# Patient Record
Sex: FEMALE | Race: WHITE | NOT HISPANIC OR LATINO | Employment: FULL TIME | URBAN - METROPOLITAN AREA
[De-identification: names, ages, dates, MRNs, and addresses within clinical notes are randomized per-mention and may not be internally consistent; named-entity substitution may affect disease eponyms.]

---

## 2021-11-08 ENCOUNTER — TELEPHONE (OUTPATIENT)
Dept: FAMILY MEDICINE | Facility: CLINIC | Age: 31
End: 2021-11-08
Payer: COMMERCIAL

## 2021-11-08 NOTE — TELEPHONE ENCOUNTER
Recvd call from pt/Mariza, relayed mess to her per Sharon Dorantes CNP. Unable to assist with rescheduling, Mariza was coming in for a pregnancy confirmation and Sharon Dorantes CNP had no availability for the next 2 weeks. Mariza would like to just cancel, she will contact another clinic to see if she can get in elsewhere, if not she will call us bk.

## 2021-11-08 NOTE — TELEPHONE ENCOUNTER
lmtcb- pt has an apt today with Sharon Dorantes but she will not be in today. Please assist pt in rescheduling

## 2021-12-06 ENCOUNTER — TRANSFERRED RECORDS (OUTPATIENT)
Dept: PEDIATRICS | Facility: HOSPITAL | Age: 31
End: 2021-12-06

## 2021-12-06 LAB
ABO (EXTERNAL): NORMAL
ABO (EXTERNAL): NORMAL
HEMOGLOBIN (EXTERNAL): 13.8 G/DL (ref 11.7–15.5)
HEMOGLOBIN (EXTERNAL): 13.8 G/DL (ref 8–17)
HEPATITIS B SURFACE ANTIGEN (EXTERNAL): NONREACTIVE
HEPATITIS B SURFACE ANTIGEN (EXTERNAL): NONREACTIVE
HEPATITIS C ANTIBODY (EXTERNAL): NONREACTIVE
HIV1+2 AB SERPL QL IA: NONREACTIVE
PLATELET COUNT (EXTERNAL): 425 10E3/UL (ref 140–400)
PLATELET COUNT (EXTERNAL): 425 10E3/UL (ref 75–450)
RH (EXTERNAL): POSITIVE
RH (EXTERNAL): POSITIVE
RUBELLA ANTIBODY IGG (EXTERNAL): NORMAL
RUBELLA ANTIBODY IGG (EXTERNAL): NORMAL
TREPONEMA PALLIDUM ANTIBODY (EXTERNAL): NONREACTIVE
TREPONEMA PALLIDUM ANTIBODY (EXTERNAL): NONREACTIVE

## 2022-05-02 ENCOUNTER — TRANSFERRED RECORDS (OUTPATIENT)
Dept: HEALTH INFORMATION MANAGEMENT | Facility: CLINIC | Age: 32
End: 2022-05-02

## 2022-06-22 ENCOUNTER — TRANSFERRED RECORDS (OUTPATIENT)
Dept: HEALTH INFORMATION MANAGEMENT | Facility: CLINIC | Age: 32
End: 2022-06-22

## 2022-06-22 LAB — GROUP B STREPTOCOCCUS (EXTERNAL): NEGATIVE

## 2022-06-23 LAB — GROUP B STREPTOCOCCUS (EXTERNAL): NEGATIVE

## 2022-07-13 ENCOUNTER — ANESTHESIA (OUTPATIENT)
Dept: OBGYN | Facility: HOSPITAL | Age: 32
End: 2022-07-13
Payer: COMMERCIAL

## 2022-07-13 ENCOUNTER — ANESTHESIA EVENT (OUTPATIENT)
Dept: OBGYN | Facility: HOSPITAL | Age: 32
End: 2022-07-13
Payer: COMMERCIAL

## 2022-07-13 ENCOUNTER — HOSPITAL ENCOUNTER (INPATIENT)
Facility: HOSPITAL | Age: 32
LOS: 3 days | Discharge: HOME | End: 2022-07-16
Attending: OBSTETRICS & GYNECOLOGY | Admitting: OBSTETRICS & GYNECOLOGY
Payer: COMMERCIAL

## 2022-07-13 DIAGNOSIS — O92.79 INSUFFICIENT LACTATION: Primary | ICD-10-CM

## 2022-07-13 PROBLEM — Z34.90 PREGNANCY: Status: ACTIVE | Noted: 2022-07-13

## 2022-07-13 PROBLEM — Z36.89 ENCOUNTER FOR TRIAGE IN PREGNANT PATIENT: Status: ACTIVE | Noted: 2022-07-13

## 2022-07-13 LAB
ABO/RH(D): NORMAL
ANTIBODY SCREEN: NEGATIVE
APTT PPP: 29 SECONDS (ref 22–38)
ERYTHROCYTE [DISTWIDTH] IN BLOOD BY AUTOMATED COUNT: 13.2 % (ref 10–15)
HCT VFR BLD AUTO: 42.6 % (ref 35–47)
HGB BLD-MCNC: 14.5 G/DL (ref 11.7–15.7)
INR PPP: 0.92 (ref 0.85–1.15)
MCH RBC QN AUTO: 31 PG (ref 26.5–33)
MCHC RBC AUTO-ENTMCNC: 34 G/DL (ref 31.5–36.5)
MCV RBC AUTO: 91 FL (ref 78–100)
PLATELET # BLD AUTO: 302 10E3/UL (ref 150–450)
RBC # BLD AUTO: 4.67 10E6/UL (ref 3.8–5.2)
RUPTURE OF FETAL MEMBRANES BY ROM PLUS: POSITIVE
SPECIMEN EXPIRATION DATE: NORMAL
T PALLIDUM AB SER QL: NONREACTIVE
WBC # BLD AUTO: 18.9 10E3/UL (ref 4–11)

## 2022-07-13 PROCEDURE — 250N000013 HC RX MED GY IP 250 OP 250 PS 637: Performed by: OBSTETRICS & GYNECOLOGY

## 2022-07-13 PROCEDURE — 86780 TREPONEMA PALLIDUM: CPT | Performed by: OBSTETRICS & GYNECOLOGY

## 2022-07-13 PROCEDURE — 250N000009 HC RX 250: Performed by: ANESTHESIOLOGY

## 2022-07-13 PROCEDURE — 36415 COLL VENOUS BLD VENIPUNCTURE: CPT | Performed by: OBSTETRICS & GYNECOLOGY

## 2022-07-13 PROCEDURE — 85730 THROMBOPLASTIN TIME PARTIAL: CPT | Performed by: OBSTETRICS & GYNECOLOGY

## 2022-07-13 PROCEDURE — 84112 EVAL AMNIOTIC FLUID PROTEIN: CPT | Performed by: OBSTETRICS & GYNECOLOGY

## 2022-07-13 PROCEDURE — 120N000001 HC R&B MED SURG/OB

## 2022-07-13 PROCEDURE — 370N000003 HC ANESTHESIA WARD SERVICE

## 2022-07-13 PROCEDURE — 86901 BLOOD TYPING SEROLOGIC RH(D): CPT | Performed by: OBSTETRICS & GYNECOLOGY

## 2022-07-13 PROCEDURE — 250N000011 HC RX IP 250 OP 636: Performed by: OBSTETRICS & GYNECOLOGY

## 2022-07-13 PROCEDURE — 250N000009 HC RX 250: Performed by: OBSTETRICS & GYNECOLOGY

## 2022-07-13 PROCEDURE — 85610 PROTHROMBIN TIME: CPT | Performed by: OBSTETRICS & GYNECOLOGY

## 2022-07-13 PROCEDURE — 85027 COMPLETE CBC AUTOMATED: CPT | Performed by: OBSTETRICS & GYNECOLOGY

## 2022-07-13 PROCEDURE — 250N000011 HC RX IP 250 OP 636: Performed by: ANESTHESIOLOGY

## 2022-07-13 PROCEDURE — 258N000003 HC RX IP 258 OP 636: Performed by: OBSTETRICS & GYNECOLOGY

## 2022-07-13 RX ORDER — OXYTOCIN 10 [USP'U]/ML
10 INJECTION, SOLUTION INTRAMUSCULAR; INTRAVENOUS
Status: DISCONTINUED | OUTPATIENT
Start: 2022-07-13 | End: 2022-07-16 | Stop reason: HOSPADM

## 2022-07-13 RX ORDER — SODIUM CHLORIDE, SODIUM LACTATE, POTASSIUM CHLORIDE, CALCIUM CHLORIDE 600; 310; 30; 20 MG/100ML; MG/100ML; MG/100ML; MG/100ML
INJECTION, SOLUTION INTRAVENOUS CONTINUOUS
Status: DISCONTINUED | OUTPATIENT
Start: 2022-07-13 | End: 2022-07-16 | Stop reason: HOSPADM

## 2022-07-13 RX ORDER — NALOXONE HYDROCHLORIDE 0.4 MG/ML
0.4 INJECTION, SOLUTION INTRAMUSCULAR; INTRAVENOUS; SUBCUTANEOUS
Status: DISCONTINUED | OUTPATIENT
Start: 2022-07-13 | End: 2022-07-14 | Stop reason: HOSPADM

## 2022-07-13 RX ORDER — CITRIC ACID/SODIUM CITRATE 334-500MG
30 SOLUTION, ORAL ORAL
Status: DISCONTINUED | OUTPATIENT
Start: 2022-07-13 | End: 2022-07-14 | Stop reason: HOSPADM

## 2022-07-13 RX ORDER — KETOROLAC TROMETHAMINE 30 MG/ML
30 INJECTION, SOLUTION INTRAMUSCULAR; INTRAVENOUS
Status: DISCONTINUED | OUTPATIENT
Start: 2022-07-13 | End: 2022-07-16 | Stop reason: HOSPADM

## 2022-07-13 RX ORDER — LIDOCAINE 40 MG/G
CREAM TOPICAL
Status: DISCONTINUED | OUTPATIENT
Start: 2022-07-13 | End: 2022-07-14 | Stop reason: HOSPADM

## 2022-07-13 RX ORDER — LIDOCAINE HYDROCHLORIDE AND EPINEPHRINE 15; 5 MG/ML; UG/ML
3 INJECTION, SOLUTION EPIDURAL
Status: DISCONTINUED | OUTPATIENT
Start: 2022-07-13 | End: 2022-07-14 | Stop reason: HOSPADM

## 2022-07-13 RX ORDER — OXYTOCIN/0.9 % SODIUM CHLORIDE 30/500 ML
1-5 PLASTIC BAG, INJECTION (ML) INTRAVENOUS CONTINUOUS
Status: DISCONTINUED | OUTPATIENT
Start: 2022-07-13 | End: 2022-07-14 | Stop reason: HOSPADM

## 2022-07-13 RX ORDER — MISOPROSTOL 200 UG/1
TABLET ORAL
Status: DISCONTINUED
Start: 2022-07-13 | End: 2022-07-14 | Stop reason: WASHOUT

## 2022-07-13 RX ORDER — OXYTOCIN/0.9 % SODIUM CHLORIDE 30/500 ML
340 PLASTIC BAG, INJECTION (ML) INTRAVENOUS CONTINUOUS PRN
Status: DISCONTINUED | OUTPATIENT
Start: 2022-07-13 | End: 2022-07-14 | Stop reason: HOSPADM

## 2022-07-13 RX ORDER — ONDANSETRON 2 MG/ML
4 INJECTION INTRAMUSCULAR; INTRAVENOUS EVERY 6 HOURS PRN
Status: DISCONTINUED | OUTPATIENT
Start: 2022-07-13 | End: 2022-07-14 | Stop reason: HOSPADM

## 2022-07-13 RX ORDER — LIDOCAINE HYDROCHLORIDE 10 MG/ML
INJECTION, SOLUTION EPIDURAL; INFILTRATION; INTRACAUDAL; PERINEURAL
Status: DISPENSED
Start: 2022-07-13 | End: 2022-07-14

## 2022-07-13 RX ORDER — NALOXONE HYDROCHLORIDE 0.4 MG/ML
0.2 INJECTION, SOLUTION INTRAMUSCULAR; INTRAVENOUS; SUBCUTANEOUS
Status: DISCONTINUED | OUTPATIENT
Start: 2022-07-13 | End: 2022-07-14 | Stop reason: HOSPADM

## 2022-07-13 RX ORDER — OXYTOCIN/0.9 % SODIUM CHLORIDE 30/500 ML
100-340 PLASTIC BAG, INJECTION (ML) INTRAVENOUS CONTINUOUS PRN
Status: DISCONTINUED | OUTPATIENT
Start: 2022-07-13 | End: 2022-07-16 | Stop reason: HOSPADM

## 2022-07-13 RX ORDER — CARBOPROST TROMETHAMINE 250 UG/ML
250 INJECTION, SOLUTION INTRAMUSCULAR
Status: DISCONTINUED | OUTPATIENT
Start: 2022-07-13 | End: 2022-07-14 | Stop reason: HOSPADM

## 2022-07-13 RX ORDER — ONDANSETRON 4 MG/1
4 TABLET, ORALLY DISINTEGRATING ORAL EVERY 6 HOURS PRN
Status: DISCONTINUED | OUTPATIENT
Start: 2022-07-13 | End: 2022-07-14 | Stop reason: HOSPADM

## 2022-07-13 RX ORDER — NALBUPHINE HYDROCHLORIDE 10 MG/ML
2.5-5 INJECTION, SOLUTION INTRAMUSCULAR; INTRAVENOUS; SUBCUTANEOUS EVERY 6 HOURS PRN
Status: DISCONTINUED | OUTPATIENT
Start: 2022-07-13 | End: 2022-07-16 | Stop reason: HOSPADM

## 2022-07-13 RX ORDER — FENTANYL CITRATE 50 UG/ML
50-100 INJECTION, SOLUTION INTRAMUSCULAR; INTRAVENOUS
Status: DISCONTINUED | OUTPATIENT
Start: 2022-07-13 | End: 2022-07-16 | Stop reason: HOSPADM

## 2022-07-13 RX ORDER — LIDOCAINE 40 MG/G
CREAM TOPICAL
Status: DISCONTINUED | OUTPATIENT
Start: 2022-07-13 | End: 2022-07-13 | Stop reason: HOSPADM

## 2022-07-13 RX ORDER — OXYTOCIN 10 [USP'U]/ML
10 INJECTION, SOLUTION INTRAMUSCULAR; INTRAVENOUS
Status: DISCONTINUED | OUTPATIENT
Start: 2022-07-13 | End: 2022-07-14 | Stop reason: HOSPADM

## 2022-07-13 RX ORDER — CALCIUM CARBONATE 500 MG/1
500 TABLET, CHEWABLE ORAL 3 TIMES DAILY PRN
Status: DISCONTINUED | OUTPATIENT
Start: 2022-07-13 | End: 2022-07-16 | Stop reason: HOSPADM

## 2022-07-13 RX ORDER — IBUPROFEN 800 MG/1
800 TABLET, FILM COATED ORAL
Status: DISCONTINUED | OUTPATIENT
Start: 2022-07-13 | End: 2022-07-16 | Stop reason: HOSPADM

## 2022-07-13 RX ORDER — PRENATAL VIT/IRON FUM/FOLIC AC 27MG-0.8MG
1 TABLET ORAL DAILY
COMMUNITY

## 2022-07-13 RX ORDER — EPHEDRINE SULFATE 50 MG/ML
5 INJECTION, SOLUTION INTRAMUSCULAR; INTRAVENOUS; SUBCUTANEOUS
Status: DISCONTINUED | OUTPATIENT
Start: 2022-07-13 | End: 2022-07-14 | Stop reason: HOSPADM

## 2022-07-13 RX ORDER — SODIUM CHLORIDE, SODIUM LACTATE, POTASSIUM CHLORIDE, CALCIUM CHLORIDE 600; 310; 30; 20 MG/100ML; MG/100ML; MG/100ML; MG/100ML
INJECTION, SOLUTION INTRAVENOUS CONTINUOUS
Status: DISCONTINUED | OUTPATIENT
Start: 2022-07-13 | End: 2022-07-14 | Stop reason: HOSPADM

## 2022-07-13 RX ADMIN — FENTANYL CITRATE 100 MCG: 50 INJECTION, SOLUTION INTRAMUSCULAR; INTRAVENOUS at 12:23

## 2022-07-13 RX ADMIN — Medication 1 MILLI-UNITS/MIN: at 07:36

## 2022-07-13 RX ADMIN — CALCIUM CARBONATE (ANTACID) CHEW TAB 500 MG 500 MG: 500 CHEW TAB at 19:29

## 2022-07-13 RX ADMIN — FENTANYL CITRATE 100 MCG: 50 INJECTION, SOLUTION INTRAMUSCULAR; INTRAVENOUS at 09:56

## 2022-07-13 RX ADMIN — SODIUM CHLORIDE, POTASSIUM CHLORIDE, SODIUM LACTATE AND CALCIUM CHLORIDE: 600; 310; 30; 20 INJECTION, SOLUTION INTRAVENOUS at 06:50

## 2022-07-13 RX ADMIN — SODIUM CHLORIDE, POTASSIUM CHLORIDE, SODIUM LACTATE AND CALCIUM CHLORIDE: 600; 310; 30; 20 INJECTION, SOLUTION INTRAVENOUS at 07:30

## 2022-07-13 RX ADMIN — Medication 10 ML/HR: at 21:02

## 2022-07-13 RX ADMIN — FENTANYL CITRATE 100 MCG: 50 INJECTION, SOLUTION INTRAMUSCULAR; INTRAVENOUS at 11:07

## 2022-07-13 RX ADMIN — Medication: at 13:15

## 2022-07-13 RX ADMIN — Medication 5 MG: at 13:36

## 2022-07-13 ASSESSMENT — ACTIVITIES OF DAILY LIVING (ADL)
DOING_ERRANDS_INDEPENDENTLY_DIFFICULTY: NO
ADLS_ACUITY_SCORE: 20
CONCENTRATING,_REMEMBERING_OR_MAKING_DECISIONS_DIFFICULTY: NO
ADLS_ACUITY_SCORE: 20
ADLS_ACUITY_SCORE: 20
DRESSING/BATHING_DIFFICULTY: NO
DIFFICULTY_EATING/SWALLOWING: NO
ADLS_ACUITY_SCORE: 20
FALL_HISTORY_WITHIN_LAST_SIX_MONTHS: NO
WALKING_OR_CLIMBING_STAIRS_DIFFICULTY: NO
WEAR_GLASSES_OR_BLIND: YES
ADLS_ACUITY_SCORE: 20
TOILETING_ISSUES: NO
VISION_MANAGEMENT: GLASSES
CHANGE_IN_FUNCTIONAL_STATUS_SINCE_ONSET_OF_CURRENT_ILLNESS/INJURY: NO
ADLS_ACUITY_SCORE: 20

## 2022-07-13 NOTE — H&P
22 Mariza Sethi  88    Chief complaint: Leaking fluid and labor.    HPI: Patient is a 32-year-old  1 para 0 white female at 40 weeks 0 days who noted the spontaneous onset of labor in the early morning hours of 2022.  She is now milagros every 4 to 5 minutes.  She is also had a somewhat watery discharge.  She is a smoker.  She also had COVID during this pregnancy.  Because of those 2 issues, she underwent fetal surveillance from 34 weeks on with reassuring findings except for a biophysical profile score recently of 6/8 with decreased movement, borderline low amniotic fluid volume and elevated umbilical artery systolic/diastolic ratio.    PMH: At her first visit, she denied medical problems except some anxiety.  She takes prenatal vitamins.  She has no known drug allergies.  She was hospitalized in  for dehydration and anxiety.    PFSH: She has been  for 5 years and smokes about 1/4 pack of cigarettes a day.  She denies alcohol use during pregnancy.    ROS: Noncontributory.    PE: Blood pressure 113/69, pulse 88, temp 98.0.    General appearance: Well-developed, well-nourished woman with a large gravid uterus.  Recent ultrasound placed the estimated weight at 8 pounds 1 ounce.  Lungs: Clear.  Heart: Normal sinus rhythm without murmurs.  Abdomen: Gravid uterus present with last fundal height in the office at 39 cm.  Fetal heart rate tracing is category 1, reactive.  Contractions are every 4 to 5 minutes.  Cervix: 1 cm, 60% effaced, far posterior.  (Nurses exam)    Assessment: 1.  Intrauterine pregnancy at 40 weeks 0 days.  2.  Spontaneous rupture of membranes with early labor.  Contractions only every 5 minutes.  3.  Past history of COVID in pregnancy.    Plan: She will be admitted and observe her progress in labor.  I will start a low-dose of oxytocin to get the contractions closer as she is quite comfortable at this point.  She will want an epidural for pain relief, she states.   I will wait until that is in before I do another exam as exams are quite uncomfortable for her.  I expect spontaneous vaginal birth.    Man Marie MD

## 2022-07-13 NOTE — ANESTHESIA PREPROCEDURE EVALUATION
Anesthesia Pre-Procedure Evaluation    Patient: Mariza Sethi   MRN: [id number] : 1990        Procedure : * No procedures listed *          History reviewed. No pertinent past medical history.   History reviewed. No pertinent surgical history.   No Known Allergies   Social History     Tobacco Use     Smoking status: Current Every Day Smoker     Packs/day: 0.25     Types: Cigarettes     Smokeless tobacco: Never Used     Tobacco comment: was on NRT   Substance Use Topics     Alcohol use: Not Currently      Wt Readings from Last 1 Encounters:   22 62.1 kg (137 lb)        Anesthesia Evaluation   Pt has not had prior anesthetic         ROS/MED HX  ENT/Pulmonary:  - neg pulmonary ROS     Neurologic:  - neg neurologic ROS     Cardiovascular:  - neg cardiovascular ROS     METS/Exercise Tolerance:     Hematologic:  - neg hematologic  ROS     Musculoskeletal:       GI/Hepatic:  - neg GI/hepatic ROS     Renal/Genitourinary:  - neg Renal ROS     Endo:  - neg endo ROS     Psychiatric/Substance Use:  - neg psychiatric ROS     Infectious Disease:  - neg infectious disease ROS     Malignancy:  - neg malignancy ROS     Other:      (+) Possibly pregnant, ,         Physical Exam    Airway  airway exam normal           Respiratory Devices and Support         Dental  no notable dental history         Cardiovascular   cardiovascular exam normal          Pulmonary   pulmonary exam normal                OUTSIDE LABS:  CBC:   Lab Results   Component Value Date    WBC 18.9 (H) 2022    HGB 14.5 2022    HCT 42.6 2022     2022     BMP: No results found for: NA, POTASSIUM, CHLORIDE, CO2, BUN, CR, GLC  COAGS:   Lab Results   Component Value Date    PTT 29 2022    INR 0.92 2022     POC: No results found for: BGM, HCG, HCGS  HEPATIC: No results found for: ALBUMIN, PROTTOTAL, ALT, AST, GGT, ALKPHOS, BILITOTAL, BILIDIRECT, NORA  OTHER: No results found for: PH, LACT, A1C, SELWYN, PHOS, MAG,  LIPASE, AMYLASE, TSH, T4, T3, CRP, SED    Anesthesia Plan    ASA Status:  2      Anesthesia Type: Epidural.              Consents            Postoperative Care    Pain management: Oral pain medications.   PONV prophylaxis: Ondansetron (or other 5HT-3)     Comments:                Denilson Wynn MD

## 2022-07-13 NOTE — PROGRESS NOTES
Dr. Marie on unit. Reviewed tracing. ts CAT I and irreg cxns.  MD and RN in to see pt and family. MD discussed plan to start low dose Pitocin as cxns 5-9 min apart. Pt in agreement. MD stated that pt can get an epidural at any time. RN discussed the need for IVFs before MDA can be called. Information given that pt will be on bedrest after the epidural, will get a srivastava catheter and will be allowed clear liquids. Also discussed expectation of the epidural. Pt will let RN know when she is ready. LR started at 125ml/hr in preparation for Pitocin.Lisette Villalta RN

## 2022-07-13 NOTE — PLAN OF CARE
Problem: Plan of Care - These are the overarching goals to be used throughout the patient stay.    Goal: Plan of Care Review/Shift Note  Description: The Plan of Care Review/Shift note should be completed every shift.  The Outcome Evaluation is a brief statement about your assessment that the patient is improving, declining, or no change.  This information will be displayed automatically on your shift note.  7/13/2022 0426 by Lisette Villalta RN  Outcome: Ongoing, Progressing  Flowsheets (Taken 7/13/2022 0426)  Plan of Care Reviewed With:   patient   significant other  Outcome Evaluation: pt will have a successful progression of labor without infection  7/13/2022 0425 by Lisette Villalta RN  Outcome: Ongoing, Progressing    Problem: Delayed Labor Progression (Labor)  Goal: Effective Progression to Delivery  7/13/2022 0426 by Lisette Villalta RN  Outcome: Ongoing, Progressing    Problem: Infection (Labor)  Goal: Absence of Infection Signs and Symptoms  7/13/2022 0426 by Lisette Villalta RN  Outcome: Ongoing, Progressing    Problem: Change in Fetal Wellbeing (Labor)  Goal: Stable Fetal Wellbeing  7/13/2022 0426 by Lisette Villalta RN  Outcome: Ongoing, Progressing          Fhts CAT I. Irregular cxns present that are mild-mod in palpation. ROM+ positive. Will use 0200 as SROM time. Pt reported a pinkish-white discharge. No fluid noted by this RN with sve or since. Dr. Marie notified and will be in to see pt in about an hour..Lisette Villalta RN

## 2022-07-13 NOTE — ANESTHESIA PROCEDURE NOTES
Epidural catheter Procedure Note    Pre-Procedure   Staff -        Anesthesiologist:  Denilson Wynn MD       Performed By: anesthesiologist       Location: OB       Procedure Start/Stop Times: 7/13/2022 1:00 PM and 7/13/2022 1:20 PM       Pre-Anesthestic Checklist: patient identified, IV checked, risks and benefits discussed, informed consent, monitors and equipment checked, pre-op evaluation, at physician/surgeon's request and post-op pain management  Timeout:       Correct Patient: Yes        Correct Procedure: Yes        Correct Site: Yes        Correct Position: Yes   Procedure Documentation  Procedure: epidural catheter       Diagnosis: Labor       Patient Position: sitting       Patient Prep/Sterile Barriers: sterile gloves, mask, patient draped       Skin prep: Chloraprep       Local skin infiltrated with mL of 1% lidocaine.        Insertion Site: L1-2. (midline approach).       Technique: LORT air        Needle Type: Dariana       Needle Gauge: 18.        Needle Length (Inches): 3.5        Catheter: 20 G.          Catheter threaded easily.         6 cm epidural space.         Threaded 10 cm at skin.         # of attempts: 1 and  # of redirects:  0    Assessment/Narrative         Paresthesias: No.       Test dose of 3 mL lidocaine 1.5% w/ 1:200,000 epinephrine at.         Test dose negative, 3 minutes after injection, for signs of intravascular, subdural, or intrathecal injection.       Insertion/Infusion Method: LORT air       Aspiration negative for Heme or CSF via Epidural Catheter.    Medication(s) Administered   Medication Administration Time: 7/13/2022 1:00 PM

## 2022-07-13 NOTE — PROGRESS NOTES
Pt notified of conversation with Dr. Marie re: getting ROM+ and checking cervix. Pt in agreement.   ROM+ collected and sent.   sve done and was 1/60/-3, moderate to soft and posterior. Vertex palpated. No bloody show.   Dr. Marie notified via phone. States no change from yesterday. Informed RN that BPP recently was 6/8 for low fluid and umbilical artery systolic and diastolic reading was 3.8; which is elevated. MD would like to monitor for a couple hours and re-evaluate.   Pt and family (maddison Menendez and his Mother Amy) notified of conversation. Discussed labor definition, labor process, stages of labor. Enc pt to rest between cxns at this time. Will continue to monitor.Lisette Villalta RN

## 2022-07-13 NOTE — PROGRESS NOTES
Notified pt and family of SROM and Dr. Marie will come in about 0530. Pt asking appropriate questions.   Discussed that pt will want an epidural and explained that she will need an IV and IVFs prior to placement. Offered pt to place IV at this time, or she may wait until later if she chooses. Pt opted for IV to be placed at this time. #18 gauge IV to left lower forearm x1 attempt. New dressing applied and IV saline locked.Lisette Villalta RN

## 2022-07-13 NOTE — PROGRESS NOTES
30 y/o g1 at 40 weeks gest with edc 7-13-22 presents to [facility] with reports of cxns since 0030. Has had pinkish this discharge in last hour. Denies complications with pregnancy. GBS neg. Pt had COVID in mid May, falls within 90 days and therefore is COVID recovered. Rates cxns as 8/10 and feeling them low to abdomen.   Monitors applied and POC discussed. Pt here with SO Shon.   First cxn pt had after arrival was mild-moderate.   Pt was in clinic yesterday and cervix was closed.   VSS.  Afebrile.   Dr. Marie notified of pt arrival. MD gave orders to obtain ROM +, check cervix and call MD back.Lisette Villalta RN

## 2022-07-14 PROCEDURE — 722N000001 HC LABOR CARE VAGINAL DELIVERY SINGLE

## 2022-07-14 PROCEDURE — 250N000013 HC RX MED GY IP 250 OP 250 PS 637: Performed by: OBSTETRICS & GYNECOLOGY

## 2022-07-14 PROCEDURE — 250N000011 HC RX IP 250 OP 636: Performed by: OBSTETRICS & GYNECOLOGY

## 2022-07-14 PROCEDURE — 0DQP0ZZ REPAIR RECTUM, OPEN APPROACH: ICD-10-PCS | Performed by: OBSTETRICS & GYNECOLOGY

## 2022-07-14 PROCEDURE — 120N000001 HC R&B MED SURG/OB

## 2022-07-14 RX ORDER — OXYCODONE HYDROCHLORIDE 5 MG/1
5 TABLET ORAL EVERY 4 HOURS PRN
Status: DISCONTINUED | OUTPATIENT
Start: 2022-07-14 | End: 2022-07-16 | Stop reason: HOSPADM

## 2022-07-14 RX ORDER — NALOXONE HYDROCHLORIDE 0.4 MG/ML
0.2 INJECTION, SOLUTION INTRAMUSCULAR; INTRAVENOUS; SUBCUTANEOUS
Status: DISCONTINUED | OUTPATIENT
Start: 2022-07-14 | End: 2022-07-16 | Stop reason: HOSPADM

## 2022-07-14 RX ORDER — IBUPROFEN 800 MG/1
800 TABLET, FILM COATED ORAL EVERY 6 HOURS PRN
Status: DISCONTINUED | OUTPATIENT
Start: 2022-07-14 | End: 2022-07-16 | Stop reason: HOSPADM

## 2022-07-14 RX ORDER — BISACODYL 10 MG
10 SUPPOSITORY, RECTAL RECTAL DAILY PRN
Status: DISCONTINUED | OUTPATIENT
Start: 2022-07-14 | End: 2022-07-16 | Stop reason: HOSPADM

## 2022-07-14 RX ORDER — NALOXONE HYDROCHLORIDE 0.4 MG/ML
0.4 INJECTION, SOLUTION INTRAMUSCULAR; INTRAVENOUS; SUBCUTANEOUS
Status: DISCONTINUED | OUTPATIENT
Start: 2022-07-14 | End: 2022-07-16 | Stop reason: HOSPADM

## 2022-07-14 RX ORDER — ACETAMINOPHEN 325 MG/1
650 TABLET ORAL EVERY 4 HOURS PRN
Status: DISCONTINUED | OUTPATIENT
Start: 2022-07-14 | End: 2022-07-16 | Stop reason: HOSPADM

## 2022-07-14 RX ORDER — DOCUSATE SODIUM 100 MG/1
100 CAPSULE, LIQUID FILLED ORAL DAILY
Status: DISCONTINUED | OUTPATIENT
Start: 2022-07-14 | End: 2022-07-15

## 2022-07-14 RX ORDER — MODIFIED LANOLIN
OINTMENT (GRAM) TOPICAL
Status: DISCONTINUED | OUTPATIENT
Start: 2022-07-14 | End: 2022-07-16 | Stop reason: HOSPADM

## 2022-07-14 RX ORDER — HYDROCORTISONE 25 MG/G
CREAM TOPICAL 3 TIMES DAILY PRN
Status: DISCONTINUED | OUTPATIENT
Start: 2022-07-14 | End: 2022-07-16 | Stop reason: HOSPADM

## 2022-07-14 RX ADMIN — ACETAMINOPHEN 650 MG: 325 TABLET, FILM COATED ORAL at 07:51

## 2022-07-14 RX ADMIN — Medication: at 07:50

## 2022-07-14 RX ADMIN — ACETAMINOPHEN 650 MG: 325 TABLET, FILM COATED ORAL at 16:17

## 2022-07-14 RX ADMIN — IBUPROFEN 800 MG: 800 TABLET ORAL at 18:15

## 2022-07-14 RX ADMIN — IBUPROFEN 800 MG: 800 TABLET ORAL at 11:32

## 2022-07-14 RX ADMIN — KETOROLAC TROMETHAMINE 30 MG: 30 INJECTION, SOLUTION INTRAMUSCULAR; INTRAVENOUS at 05:23

## 2022-07-14 RX ADMIN — ACETAMINOPHEN 650 MG: 325 TABLET, FILM COATED ORAL at 21:11

## 2022-07-14 RX ADMIN — Medication: at 21:11

## 2022-07-14 RX ADMIN — CALCIUM CARBONATE (ANTACID) CHEW TAB 500 MG 500 MG: 500 CHEW TAB at 05:22

## 2022-07-14 RX ADMIN — OXYCODONE HYDROCHLORIDE 5 MG: 5 TABLET ORAL at 18:43

## 2022-07-14 RX ADMIN — DOCUSATE SODIUM 100 MG: 100 CAPSULE, LIQUID FILLED ORAL at 07:51

## 2022-07-14 RX ADMIN — ACETAMINOPHEN 650 MG: 325 TABLET, FILM COATED ORAL at 11:53

## 2022-07-14 RX ADMIN — CALCIUM CARBONATE (ANTACID) CHEW TAB 500 MG 500 MG: 500 CHEW TAB at 01:09

## 2022-07-14 RX ADMIN — OXYCODONE HYDROCHLORIDE 5 MG: 5 TABLET ORAL at 22:58

## 2022-07-14 RX ADMIN — WITCH HAZEL: 500 SOLUTION RECTAL; TOPICAL at 07:50

## 2022-07-14 ASSESSMENT — ACTIVITIES OF DAILY LIVING (ADL)
ADLS_ACUITY_SCORE: 20
ADLS_ACUITY_SCORE: 24
ADLS_ACUITY_SCORE: 20
ADLS_ACUITY_SCORE: 24
ADLS_ACUITY_SCORE: 24
ADLS_ACUITY_SCORE: 21
ADLS_ACUITY_SCORE: 20
ADLS_ACUITY_SCORE: 21
ADLS_ACUITY_SCORE: 24
ADLS_ACUITY_SCORE: 24

## 2022-07-14 NOTE — L&D DELIVERY NOTE
22 Mariza Sethi [facility] 88    Delivery note    Patient is a 32-year-old  1 now para 1 woman who presented in spontaneous labor in the early morning hours of 2022.  She was milagros every 4 to 5 minutes and had a discharge.  The discharge was tested and found to be positive for rupture of membranes.  She was undergoing fetal surveillance for COVID during the pregnancy and a smoking history.  Her last biophysical profile score was 6 out of 8 with decreased gross body movements and an elevated umbilical artery SD ratio of 3.68.  Amniotic fluid volume was low normal.  She was started on low-dose oxytocin because of the ruptured membranes and made slow progress through labor with an epidural for pain relief.  She was completely dilated at 2230  hours and pushed for 2 hours and 45 minutes.  She was fatigued and rested for 15 minutes and then pushed again for short time.  She was then allowed to labor down for an hour and started pushing again.  By that time the vertex was visible at the introitus with a push and she had been in the second stage for 5 1/2 hours.  I recommended a vacuum-assisted vaginal birth and discussed the risks and she was agreeable.  Kiwi vacuum was applied.  On the third pull it was apparent that there was no elasticity in the introitus, therefore I cut a midline episiotomy to facilitate the birth.  There were no pop offs.  The baby was vigorous with Apgar scores of 9 and 9.  The placenta followed spontaneously intact.  There was small 4th degree perineal laceration which lacerated the anal sphincter down into the right.  I repaired the laceration in the routine manner with 3-0 Vicryl suture and 0 PDS suture on the sphincter muscle and capsule.  Estimated blood loss was 700 to 800 cc.  I expect routine postpartum care except for healing of the small fourth degree laceration.    Man Marie MD

## 2022-07-14 NOTE — PLAN OF CARE
Pt is doing well postpartum.  Vaginal bleeding is light and fundus is firm.  Pt is breastfeeding well and is bonding with the baby.  She denies pain. VSS Afebrile

## 2022-07-14 NOTE — PLAN OF CARE
Problem: Urinary Retention (Postpartum Vaginal Delivery)  Goal: Effective Urinary Elimination  Outcome: Met      Problem: Pain (Postpartum Vaginal Delivery)  Goal: Acceptable Pain Control  Outcome: Ongoing, Progressing  Intervention: Prevent or Manage Pain  Recent Flowsheet Documentation  Taken 7/14/2022 1132 by Kateryna Ngo, RN  Pain Management Interventions:   medication (see MAR)   repositioned  Taken 7/14/2022 0800 by Kateryna Ngo, RN  Pain Management Interventions: medication (see MAR)   Patient had mild abdominal cramping this morning.  Patient reports perineum pain at 6/10.  Patient given tylenol, ibuprofen, tucks, dermoplast and ice pack to perineum per MD orders.  Patient left leg still with some numbness  Able to bed knee and move extremity up and down in bed but unable to lift left leg up off of bed.  Patient unable to bear weight on LLE without knee buckling.  EZ stand used to assist patient to bathroom x3 this shift.  Will continue to monitor.

## 2022-07-14 NOTE — LACTATION NOTE
This note was copied from a baby's chart.  Lactation consultant to patient room to assess breastfeeding. Mom states baby latched well after delivery, but has been sleepy since delivery. States she has been educated on hand expression, and gotten a few drops per side prior to this feeding attempt.    Reviewed benefit of skin to skin prior to feeding to help get baby ready for feeding, importance of feeding baby on early hunger cues, and breastfeeding 8-12 times in 24 hours for optimal infant nutrition and hydration as well as for building an optimal milk supply. Education given regarding importance of optimal positioning for deep, comfortable latch and effective milk transfer.     Baby left STS for 45 minutes until rooting, then lactation returned to room for assessment. Oral assessment on my gloved finger: baby biting and tongue thrusting. Gentle jaw massage at jaw jointsdone for 2 minutes followed by 2 minutes of suck training, and then baby placed cross cradle position on R breast. Mom able to independently bring baby deeply onto breast, and baby actively, rhythmically sucking with swallows for 20 minutes.    Mom very sleepy. Instructed to breastfeeding on 2nd breast. Answered questions and gave encouragement.

## 2022-07-15 LAB — HGB BLD-MCNC: 12.1 G/DL (ref 11.7–15.7)

## 2022-07-15 PROCEDURE — 36415 COLL VENOUS BLD VENIPUNCTURE: CPT | Performed by: OBSTETRICS & GYNECOLOGY

## 2022-07-15 PROCEDURE — 250N000013 HC RX MED GY IP 250 OP 250 PS 637: Performed by: OBSTETRICS & GYNECOLOGY

## 2022-07-15 PROCEDURE — 120N000001 HC R&B MED SURG/OB

## 2022-07-15 PROCEDURE — 85018 HEMOGLOBIN: CPT | Performed by: OBSTETRICS & GYNECOLOGY

## 2022-07-15 RX ORDER — POLYETHYLENE GLYCOL 3350 17 G/17G
17 POWDER, FOR SOLUTION ORAL DAILY
Status: DISCONTINUED | OUTPATIENT
Start: 2022-07-15 | End: 2022-07-16 | Stop reason: HOSPADM

## 2022-07-15 RX ORDER — AMOXICILLIN 250 MG
1 CAPSULE ORAL 2 TIMES DAILY
Status: DISCONTINUED | OUTPATIENT
Start: 2022-07-15 | End: 2022-07-16 | Stop reason: HOSPADM

## 2022-07-15 RX ADMIN — IBUPROFEN 800 MG: 800 TABLET ORAL at 06:39

## 2022-07-15 RX ADMIN — SENNOSIDES AND DOCUSATE SODIUM 1 TABLET: 50; 8.6 TABLET ORAL at 21:06

## 2022-07-15 RX ADMIN — OXYCODONE HYDROCHLORIDE 5 MG: 5 TABLET ORAL at 12:25

## 2022-07-15 RX ADMIN — OXYCODONE HYDROCHLORIDE 5 MG: 5 TABLET ORAL at 16:04

## 2022-07-15 RX ADMIN — ACETAMINOPHEN 650 MG: 325 TABLET, FILM COATED ORAL at 01:02

## 2022-07-15 RX ADMIN — POLYETHYLENE GLYCOL 3350 17 G: 17 POWDER, FOR SOLUTION ORAL at 12:21

## 2022-07-15 RX ADMIN — IBUPROFEN 800 MG: 800 TABLET ORAL at 20:12

## 2022-07-15 RX ADMIN — ACETAMINOPHEN 650 MG: 325 TABLET, FILM COATED ORAL at 05:15

## 2022-07-15 RX ADMIN — OXYCODONE HYDROCHLORIDE 5 MG: 5 TABLET ORAL at 04:01

## 2022-07-15 RX ADMIN — SENNOSIDES AND DOCUSATE SODIUM 1 TABLET: 50; 8.6 TABLET ORAL at 12:21

## 2022-07-15 RX ADMIN — IBUPROFEN 800 MG: 800 TABLET ORAL at 01:01

## 2022-07-15 RX ADMIN — ACETAMINOPHEN 650 MG: 325 TABLET, FILM COATED ORAL at 20:12

## 2022-07-15 RX ADMIN — DOCUSATE SODIUM 100 MG: 100 CAPSULE, LIQUID FILLED ORAL at 08:22

## 2022-07-15 RX ADMIN — ACETAMINOPHEN 650 MG: 325 TABLET, FILM COATED ORAL at 16:04

## 2022-07-15 RX ADMIN — OXYCODONE HYDROCHLORIDE 5 MG: 5 TABLET ORAL at 20:12

## 2022-07-15 RX ADMIN — OXYCODONE HYDROCHLORIDE 5 MG: 5 TABLET ORAL at 08:21

## 2022-07-15 RX ADMIN — IBUPROFEN 800 MG: 800 TABLET ORAL at 13:42

## 2022-07-15 RX ADMIN — ACETAMINOPHEN 650 MG: 325 TABLET, FILM COATED ORAL at 09:47

## 2022-07-15 ASSESSMENT — ACTIVITIES OF DAILY LIVING (ADL)
ADLS_ACUITY_SCORE: 21

## 2022-07-15 NOTE — ANESTHESIA POSTPROCEDURE EVALUATION
Patient: Mariza Sethi    Procedure: * No procedures listed *       Anesthesia Type:  Epidural    Note:     Postop Pain Control: Uneventful            Sign Out: Well controlled pain   PONV: No   Neuro/Psych:             Sign Out: Other neuro status (Patient has right LE quad weakness and some hip weakness that are both improving. Right LE thigh papresthesia improving.)   Airway/Respiratory: Uneventful            Sign Out: Acceptable/Baseline resp. status   CV/Hemodynamics: Uneventful            Sign Out: Acceptable CV status; No obvious hypovolemia; No obvious fluid overload   Other NRE:    DID A NON-ROUTINE EVENT OCCUR?     Event details/Postop Comments:      If symptoms do not continue to improve may consider MRI. No back pain. Epidural placed easily. No paresthesias upon placement.           Last vitals:  Vitals:    07/14/22 1815 07/15/22 0102 07/15/22 0755   BP: 95/47 99/54 90/53   Pulse: 65 62 67   Resp: 16 16 16   Temp: 36.5  C (97.7  F) 36.7  C (98  F) 36.6  C (97.8  F)   SpO2: 98%  96%       Electronically Signed By: Narcisa Joseph MD  July 15, 2022  12:09 PM

## 2022-07-15 NOTE — PROGRESS NOTES
"The anesthesiologist was called at 1915 to report that the patient is still unable to put her R foot forward as if to take a normal walking step, as her leg still \"hina\" with each attempt.  She is able to successfully shuffle forward holding on to a person on each side.  He said to give the epidural more time to wear off, and that she will be evaluated tomorrow on rounds.              "

## 2022-07-15 NOTE — PROGRESS NOTES
7-15-22    S: Pt has weakness and tingling in her right thigh, hip and leg. Not able to walk on her own. Some perineal soreness from laceration. Baby doing well.    O: VSS Afebrile  H.5 before birth. 12.1 now.    A: PPD #1, with right leg weakness and numbness.  Perineal soreness.    P: Anesthesia consult. Observe.    Jaime Marie MD

## 2022-07-15 NOTE — PROGRESS NOTES
Anesthesia was called this morning to request that they come and evaluate patient.  She is now able to ambulate with very small steps without her R leg buckling, but when lying on her back, she is unable to lift the leg against gravity.  Anesthesia said they will come to evaluate.

## 2022-07-15 NOTE — PLAN OF CARE
Patient is managing pain with Oxycodone, Ibuprofen and Tylenol.   Using Tucks, Dermaplast and ice to sore/swollen perineum.   Right leg is still weaker than the left since the epidural/ delivery. Patient states there is some improvement in strength and ability to walk, but still requires assist of at least one person when up to the bathroom.   Patient is hopeful for discharge to home today.   Still needs to complete mood assessment and birth certificate.    Problem: Adjustment to Role Transition (Postpartum Vaginal Delivery)  Goal: Successful Maternal Role Transition  Outcome: Ongoing, Progressing  Intervention: Support Maternal Role Transition  Recent Flowsheet Documentation  Taken 7/15/2022 0102 by Rahel Boyle, RN  Supportive Measures:    active listening utilized    counseling provided    positive reinforcement provided  Parent/Child Attachment Promotion:    caring behavior modeled    cue recognition promoted    interaction encouraged    participation in care promoted    positive reinforcement provided     Problem: Bleeding (Postpartum Vaginal Delivery)  Goal: Hemostasis  Outcome: Ongoing, Progressing     Problem: Infection (Postpartum Vaginal Delivery)  Goal: Absence of Infection Signs/Symptoms  Outcome: Ongoing, Progressing     Problem: Pain (Postpartum Vaginal Delivery)  Goal: Acceptable Pain Control  Outcome: Ongoing, Progressing  Intervention: Prevent or Manage Pain  Recent Flowsheet Documentation  Taken 7/15/2022 0102 by Rahel Boyle, RN  Pain Management Interventions:    medication (see MAR)    emotional support    repositioned    rest     Problem: Urinary Retention (Postpartum Vaginal Delivery)  Goal: Effective Urinary Elimination  Outcome: Ongoing, Progressing

## 2022-07-15 NOTE — PROGRESS NOTES
Dr Anderson called on cell phone and ordered received for heating pad. Updated on right leg weakness that has been improving but unable to bear full weight. Informed MDA will see in AM. MD will also see in AM.

## 2022-07-15 NOTE — PLAN OF CARE
Mariza's vital signs remain stable.  She's voiding without difficulty.  I able to ambulate with aid of one person on her R side.  (See all notes re: anesthesia.)  Mariza c/o uterine cramping pain and perineal pain.  She is looking forward to soaking in a warm tub this afternoon.  She's taking Tylenol, Ibuprofen and Oxy as often as orders allow.  Her perineal repair is intact, slightly swollen, and she has several small hemorrhoids.  She's been using ice packs and Tucks pads alternately.  Breastfeeding is going very well.  Baby has good latch and suck, and many swallows can be heard.  Mariza's nipples are comfortable and intact.  Her SO Shon has been in the room most of the morning.  He is helpful and supportive.

## 2022-07-16 VITALS
HEIGHT: 61 IN | DIASTOLIC BLOOD PRESSURE: 47 MMHG | BODY MASS INDEX: 25.86 KG/M2 | RESPIRATION RATE: 18 BRPM | WEIGHT: 137 LBS | TEMPERATURE: 97.5 F | SYSTOLIC BLOOD PRESSURE: 105 MMHG | OXYGEN SATURATION: 98 % | HEART RATE: 67 BPM

## 2022-07-16 PROCEDURE — 250N000013 HC RX MED GY IP 250 OP 250 PS 637: Performed by: OBSTETRICS & GYNECOLOGY

## 2022-07-16 RX ADMIN — IBUPROFEN 800 MG: 800 TABLET ORAL at 02:16

## 2022-07-16 RX ADMIN — ACETAMINOPHEN 650 MG: 325 TABLET, FILM COATED ORAL at 13:39

## 2022-07-16 RX ADMIN — OXYCODONE HYDROCHLORIDE 5 MG: 5 TABLET ORAL at 09:08

## 2022-07-16 RX ADMIN — ACETAMINOPHEN 650 MG: 325 TABLET, FILM COATED ORAL at 05:37

## 2022-07-16 RX ADMIN — ACETAMINOPHEN 650 MG: 325 TABLET, FILM COATED ORAL at 09:09

## 2022-07-16 RX ADMIN — ACETAMINOPHEN 650 MG: 325 TABLET, FILM COATED ORAL at 00:15

## 2022-07-16 RX ADMIN — IBUPROFEN 800 MG: 800 TABLET ORAL at 08:52

## 2022-07-16 RX ADMIN — POLYETHYLENE GLYCOL 3350 17 G: 17 POWDER, FOR SOLUTION ORAL at 08:51

## 2022-07-16 RX ADMIN — OXYCODONE HYDROCHLORIDE 5 MG: 5 TABLET ORAL at 00:15

## 2022-07-16 RX ADMIN — SENNOSIDES AND DOCUSATE SODIUM 1 TABLET: 50; 8.6 TABLET ORAL at 08:52

## 2022-07-16 RX ADMIN — OXYCODONE HYDROCHLORIDE 5 MG: 5 TABLET ORAL at 13:38

## 2022-07-16 RX ADMIN — OXYCODONE HYDROCHLORIDE 5 MG: 5 TABLET ORAL at 05:37

## 2022-07-16 ASSESSMENT — ACTIVITIES OF DAILY LIVING (ADL)
ADLS_ACUITY_SCORE: 21

## 2022-07-16 NOTE — PLAN OF CARE
"  Problem: Plan of Care - These are the overarching goals to be used throughout the patient stay.    Goal: Plan of Care Review/Shift Note  Description: The Plan of Care Review/Shift note should be completed every shift.  The Outcome Evaluation is a brief statement about your assessment that the patient is improving, declining, or no change.  This information will be displayed automatically on your shift note.  Outcome: Ongoing, Progressing  Goal: Patient-Specific Goal (Individualized)  Description: You can add care plan individualizations to a care plan. Examples of Individualization might be:  \"Parent requests to be called daily at 9am for status\", \"I have a hard time hearing out of my right ear\", or \"Do not touch me to wake me up as it startles me\".  Outcome: Ongoing, Progressing  Goal: Absence of Hospital-Acquired Illness or Injury  Outcome: Ongoing, Progressing  Intervention: Prevent Skin Injury  Recent Flowsheet Documentation  Taken 7/16/2022 1300 by Gladys Pisano RN  Body Position: position changed independently  Taken 7/16/2022 0900 by Gladys Pisano RN  Body Position: position changed independently  Intervention: Prevent and Manage VTE (Venous Thromboembolism) Risk  Recent Flowsheet Documentation  Taken 7/16/2022 1300 by Gladys Pisano RN  Activity Management: ambulated in ackerman  Taken 7/16/2022 0900 by Gladys Pisano RN  Activity Management: ambulated in room  Intervention: Prevent Infection  Recent Flowsheet Documentation  Taken 7/16/2022 1300 by Gladys Pisano RN  Infection Prevention: hand hygiene promoted  Taken 7/16/2022 0900 by Gladys Pisano RN  Infection Prevention: hand hygiene promoted  Goal: Optimal Comfort and Wellbeing  Outcome: Ongoing, Progressing  Intervention: Provide Person-Centered Care  Recent Flowsheet Documentation  Taken 7/16/2022 1300 by Gladys Pisano RN  Trust Relationship/Rapport:   care explained   choices provided   questions answered  Taken 7/16/2022 0900 " by Gladys Pisano RN  Trust Relationship/Rapport:   care explained   choices provided   emotional support provided   questions answered   empathic listening provided  Goal: Readiness for Transition of Care  Outcome: Ongoing, Progressing  Reviewed discharge teaching. Answered all questions. Will follow up in clinic on Monday.

## 2022-07-16 NOTE — PROGRESS NOTES
7-16-22    S: Feeling better. Able to walk if careful. Continued improvement.    O: VSS Afebrile    A: PPD #2, right leg weakness improving.  S/P fourth degree, no BM yet.    P: Discharge today. RTC 1 week to check stitches and leg. Routine instructions. Continue vitamins. O Pos. Immune to rubella.    Jaime Marie MD

## 2022-07-16 NOTE — PLAN OF CARE
"Mariza's VSS.  She's voiding without difficulty.  She's still needing 1 person assist to get to the bathroom.  She's also able to walk safely holding onto the bassinette.  Her R leg continues to gain strength, but slowly.  Her leg unexpectedly \"buckled\" once today after having been OK for most the day.  Tylenol, Ibuprofen and Oxy are still needed for perineal pain.    Problem: Adjustment to Role Transition (Postpartum Vaginal Delivery)  Goal: Successful Maternal Role Transition  Outcome: Ongoing, Progressing  Intervention: Support Maternal Role Transition  Recent Flowsheet Documentation  Taken 7/15/2022 1700 by Damaris Meyers RN  Supportive Measures:   active listening utilized   counseling provided   decision-making supported   positive reinforcement provided   problem-solving facilitated  Parent/Child Attachment Promotion:   caring behavior modeled   cue recognition promoted   positive reinforcement provided   participation in care promoted   Rodríguez has verbally expressed courtney with her baby.  She's caring for him very ably and lovingly.   "

## 2022-07-16 NOTE — PLAN OF CARE
Going-home instructions after a vaginal birth.    Congratulations on the birth of your baby boy!    Please call me if your bleeding is bright red more than a pad an hour and doesn't seem to be slowing down. Please watch for infection by taking your temperature every evening the first week. Please call me if it goes above 100 degrees. If you have stitches, soaking in a warm, soapy bathtub once or twice a day the first week you are home will keep the area clean and help it heal.     You may use Tylenol or Ibuprofen for mild pain. Both are available over the counter. Lanolin for breast feeding, Nupercainal and Tucks for soreness, Colace for a stool softener, and a variety of laxatives are all available over the counter and may be used if needed.     Please keep you stools soft for six weeks. Continue Pericolace twice daily and Miralax 17 grams daily until the first bowel movement, then change to Colace 100 mg twice daily and/or Miralax 1/2 capfull (8.5 grams) daily.    For questions, please call the office at [phone] during regular business hours. My after-hours emergency number is [phone].     Please make an appointment to see me in one week, 2 weeks and six weeks.         Jaime Marie MD

## 2022-07-16 NOTE — PLAN OF CARE
Patient is managing pain with Oxycodone, Ibuprofen and Tylenol.   Postpartum assessment WNL. Perineum is much improved without swelling. Right leg seems much stronger than yesterday but she is still walking to bathroom with assist of 1, due to fear of  leg buckling.   Patient is hopeful for discharge to home today.      Problem: Bleeding (Postpartum Vaginal Delivery)  Goal: Hemostasis  Outcome: Ongoing, Progressing     Problem: Infection (Postpartum Vaginal Delivery)  Goal: Absence of Infection Signs/Symptoms  Outcome: Ongoing, Progressing     Problem: Pain (Postpartum Vaginal Delivery)  Goal: Acceptable Pain Control  Outcome: Ongoing, Progressing  Intervention: Prevent or Manage Pain  Recent Flowsheet Documentation  Taken 7/16/2022 0015 by Rahel Boyle, RN  Pain Management Interventions:    medication (see MAR)    rest    repositioned     Problem: Adjustment to Role Transition (Postpartum Vaginal Delivery)  Goal: Successful Maternal Role Transition  Outcome: Met  Intervention: Support Maternal Role Transition  Recent Flowsheet Documentation  Taken 7/16/2022 0014 by Rahel Boyle, RN  Supportive Measures:    active listening utilized    counseling provided    decision-making supported    positive reinforcement provided    problem-solving facilitated    goal-setting facilitated    self-care encouraged    verbalization of feelings encouraged  Parent/Child Attachment Promotion:    caring behavior modeled    cue recognition promoted    positive reinforcement provided    participation in care promoted    rooming-in promoted     Problem: Urinary Retention (Postpartum Vaginal Delivery)  Goal: Effective Urinary Elimination  Outcome: Met  Intervention: Promote Effective Urinary Elimination  Recent Flowsheet Documentation  Taken 7/16/2022 0014 by Rahel Boyle, RN  Urinary Elimination Promotion: frequent voiding encouraged

## 2022-07-16 NOTE — DISCHARGE SUMMARY
7--22 Lilliana ABARCA [birth year]    Discharge summary    Date admitted: 2022    Date of discharge: 2022    Admitting diagnosis: 1.  Intrauterine pregnancy at 40 weeks 0 days.  2.  Spontaneous rupture membranes with early labor.  3.  Past history of COVID in pregnancy.    Discharge diagnosis: 1.  Same.  2.  Fourth degree perineal laceration.  3.  Leg tingling and weakness on the right side, improving.    Procedures: 2022, vacuum-assisted vaginal birth.  2022, repair fourth degree perineal laceration.    Disposition: Patient is discharged home in stable condition.    Hospital course: Patient is a 32-year-old  1 now para 1 woman who presented in labor.  She had experienced decreased body movements on a biophysical profile and an elevated umbilical artery systolic/diastolic ratio.  She made slow progress through labor with an epidural for pain relief.  She experienced a 5 and half hour second stage with an hour and 15 minutes of laboring down.  Because of her exhaustion, vacuum-assisted vaginal birth was performed with an episiotomy and a 4 degree perineal laceration.  She gave birth to an 8 pound 1 ounce male infant with Apgar scores of 9 and 9.  Her postpartum course was complicated by right leg numbness and weakness following the birth.  She was unable to walk the first day after the child was born.  Today she is able to walk but the leg is still weak.  It is improving.  She is discharged with instructions to return to the office in 1 week, 2 weeks in 6 weeks for postpartum visits.  Routine discharge instructions were discussed.  She is to continue laxatives until her first bowel movement and then stool softeners following that because of the fourth degree perineal laceration.  Her previous hemoglobin was 14.5 and her day 1 hemoglobin was 12.1.  Her blood type was O+ and she was immune to rubella.    Man Marie MD

## 2024-05-06 LAB
HEPATITIS B SURFACE ANTIGEN (EXTERNAL): NONREACTIVE
HIV1+2 AB SERPL QL IA: NONREACTIVE
PLATELET COUNT (EXTERNAL): 378 10E3/UL (ref 140–400)
RUBELLA ANTIBODY IGG (EXTERNAL): NORMAL
VDRL (SYPHILIS) (EXTERNAL): NONREACTIVE

## 2024-10-08 ENCOUNTER — HOSPITAL ENCOUNTER (OUTPATIENT)
Facility: HOSPITAL | Age: 34
Discharge: HOME | End: 2024-10-08
Attending: OBSTETRICS & GYNECOLOGY | Admitting: OBSTETRICS & GYNECOLOGY
Payer: COMMERCIAL

## 2024-10-08 ENCOUNTER — HOSPITAL ENCOUNTER (OUTPATIENT)
Facility: HOSPITAL | Age: 34
End: 2024-10-08
Admitting: OBSTETRICS & GYNECOLOGY
Payer: COMMERCIAL

## 2024-10-08 VITALS
TEMPERATURE: 98.2 F | HEART RATE: 118 BPM | HEIGHT: 60 IN | WEIGHT: 131.5 LBS | DIASTOLIC BLOOD PRESSURE: 59 MMHG | OXYGEN SATURATION: 97 % | RESPIRATION RATE: 19 BRPM | BODY MASS INDEX: 25.82 KG/M2 | SYSTOLIC BLOOD PRESSURE: 101 MMHG

## 2024-10-08 PROCEDURE — G0463 HOSPITAL OUTPT CLINIC VISIT: HCPCS

## 2024-10-08 RX ORDER — LIDOCAINE 40 MG/G
CREAM TOPICAL
Status: DISCONTINUED | OUTPATIENT
Start: 2024-10-08 | End: 2024-10-08 | Stop reason: HOSPADM

## 2024-10-08 ASSESSMENT — ACTIVITIES OF DAILY LIVING (ADL): ADLS_ACUITY_SCORE: 22

## 2024-10-08 NOTE — PROVIDER NOTIFICATION
Dr. Palmer in department, visualized FHR tracing, reactive NST. Updated on tachycardia. Ok to discharge home.

## 2024-10-08 NOTE — PROGRESS NOTES
Data: Patient presented to [facility]: 10/8/2024  3:53 PM.  Reason for maternal/fetal assessment is  arrival from clinic for fetal deceleration . Patient reports + FM, denies LOF or feeling contractions. Patient denies uterine contractions, leaking of vaginal fluid/rupture of membranes, vaginal bleeding, abdominal pain, pelvic pressure, nausea, vomiting, headache, visual disturbances, epigastric or RUQ pain, significant edema. Patient reports fetal movement is normal. Patient is a 34w6d .  Prenatal record reviewed. Pregnancy has been complicated by being a smoker.   Vital signs  tachycardia. . Support person is not present.     Action: Verbal consent for EFM. Triage assessment completed.     Response: Patient verbalized agreement with plan. Will contact Dr. Palmer with update and further orders.

## 2024-10-22 LAB — GROUP B STREPTOCOCCUS (EXTERNAL): NEGATIVE

## 2024-11-02 ENCOUNTER — ANESTHESIA EVENT (OUTPATIENT)
Dept: OBGYN | Facility: HOSPITAL | Age: 34
End: 2024-11-02
Payer: COMMERCIAL

## 2024-11-02 ENCOUNTER — ANESTHESIA (OUTPATIENT)
Dept: OBGYN | Facility: HOSPITAL | Age: 34
End: 2024-11-02
Payer: COMMERCIAL

## 2024-11-02 ENCOUNTER — HOSPITAL ENCOUNTER (INPATIENT)
Facility: HOSPITAL | Age: 34
LOS: 1 days | Discharge: HOME | End: 2024-11-03
Attending: OBSTETRICS & GYNECOLOGY
Payer: COMMERCIAL

## 2024-11-02 PROBLEM — O42.00 RUPTURE OF AMNIOTIC SAC LESS THAN 24 HOURS PRIOR TO THE ONSET OF LABOR: Status: ACTIVE | Noted: 2024-11-02

## 2024-11-02 LAB
ABO/RH(D): NORMAL
ANTIBODY SCREEN: NEGATIVE
CRYSTALS AMN MICRO: NORMAL
HGB BLD-MCNC: 13.5 G/DL (ref 11.7–15.7)
RUPTURE OF FETAL MEMBRANES BY ROM PLUS: POSITIVE
SPECIMEN EXPIRATION DATE: NORMAL

## 2024-11-02 PROCEDURE — 84112 EVAL AMNIOTIC FLUID PROTEIN: CPT | Performed by: OBSTETRICS & GYNECOLOGY

## 2024-11-02 PROCEDURE — 370N000003 HC ANESTHESIA WARD SERVICE: Performed by: ANESTHESIOLOGY

## 2024-11-02 PROCEDURE — 250N000011 HC RX IP 250 OP 636: Mod: JW | Performed by: ANESTHESIOLOGY

## 2024-11-02 PROCEDURE — 00HU33Z INSERTION OF INFUSION DEVICE INTO SPINAL CANAL, PERCUTANEOUS APPROACH: ICD-10-PCS | Performed by: ANESTHESIOLOGY

## 2024-11-02 PROCEDURE — 3E0R3BZ INTRODUCTION OF ANESTHETIC AGENT INTO SPINAL CANAL, PERCUTANEOUS APPROACH: ICD-10-PCS | Performed by: ANESTHESIOLOGY

## 2024-11-02 PROCEDURE — 59400 OBSTETRICAL CARE: CPT | Performed by: ANESTHESIOLOGY

## 2024-11-02 PROCEDURE — 86900 BLOOD TYPING SEROLOGIC ABO: CPT

## 2024-11-02 PROCEDURE — 0UQMXZZ REPAIR VULVA, EXTERNAL APPROACH: ICD-10-PCS

## 2024-11-02 PROCEDURE — 258N000003 HC RX IP 258 OP 636

## 2024-11-02 PROCEDURE — 250N000011 HC RX IP 250 OP 636

## 2024-11-02 PROCEDURE — 250N000009 HC RX 250

## 2024-11-02 PROCEDURE — 250N000013 HC RX MED GY IP 250 OP 250 PS 637

## 2024-11-02 PROCEDURE — 250N000011 HC RX IP 250 OP 636: Performed by: ANESTHESIOLOGY

## 2024-11-02 PROCEDURE — 120N000001 HC R&B MED SURG/OB

## 2024-11-02 PROCEDURE — 36415 COLL VENOUS BLD VENIPUNCTURE: CPT

## 2024-11-02 PROCEDURE — 722N000001 HC LABOR CARE VAGINAL DELIVERY SINGLE

## 2024-11-02 PROCEDURE — 85018 HEMOGLOBIN: CPT

## 2024-11-02 PROCEDURE — 86780 TREPONEMA PALLIDUM: CPT

## 2024-11-02 PROCEDURE — 86901 BLOOD TYPING SEROLOGIC RH(D): CPT

## 2024-11-02 PROCEDURE — 0KQM0ZZ REPAIR PERINEUM MUSCLE, OPEN APPROACH: ICD-10-PCS

## 2024-11-02 RX ORDER — CITRIC ACID/SODIUM CITRATE 334-500MG
30 SOLUTION, ORAL ORAL
Status: DISCONTINUED | OUTPATIENT
Start: 2024-11-02 | End: 2024-11-02 | Stop reason: HOSPADM

## 2024-11-02 RX ORDER — OXYTOCIN 10 [USP'U]/ML
10 INJECTION, SOLUTION INTRAMUSCULAR; INTRAVENOUS
Status: DISCONTINUED | OUTPATIENT
Start: 2024-11-02 | End: 2024-11-02 | Stop reason: HOSPADM

## 2024-11-02 RX ORDER — ONDANSETRON 4 MG/1
4 TABLET, ORALLY DISINTEGRATING ORAL EVERY 6 HOURS PRN
Status: DISCONTINUED | OUTPATIENT
Start: 2024-11-02 | End: 2024-11-02 | Stop reason: HOSPADM

## 2024-11-02 RX ORDER — METHYLERGONOVINE MALEATE 0.2 MG/ML
200 INJECTION INTRAVENOUS
Status: DISCONTINUED | OUTPATIENT
Start: 2024-11-02 | End: 2024-11-03 | Stop reason: HOSPADM

## 2024-11-02 RX ORDER — MISOPROSTOL 200 UG/1
800 TABLET ORAL
Status: DISCONTINUED | OUTPATIENT
Start: 2024-11-02 | End: 2024-11-03 | Stop reason: HOSPADM

## 2024-11-02 RX ORDER — METOCLOPRAMIDE 10 MG/1
10 TABLET ORAL EVERY 6 HOURS PRN
Status: DISCONTINUED | OUTPATIENT
Start: 2024-11-02 | End: 2024-11-02 | Stop reason: HOSPADM

## 2024-11-02 RX ORDER — TRANEXAMIC ACID 10 MG/ML
1 INJECTION, SOLUTION INTRAVENOUS EVERY 30 MIN PRN
Status: DISCONTINUED | OUTPATIENT
Start: 2024-11-02 | End: 2024-11-02 | Stop reason: HOSPADM

## 2024-11-02 RX ORDER — NALOXONE HYDROCHLORIDE 0.4 MG/ML
0.2 INJECTION, SOLUTION INTRAMUSCULAR; INTRAVENOUS; SUBCUTANEOUS
Status: DISCONTINUED | OUTPATIENT
Start: 2024-11-02 | End: 2024-11-02 | Stop reason: HOSPADM

## 2024-11-02 RX ORDER — PROGESTERONE 200 MG/1
400 CAPSULE ORAL DAILY
Status: ON HOLD | COMMUNITY
End: 2024-11-03

## 2024-11-02 RX ORDER — NALOXONE HYDROCHLORIDE 0.4 MG/ML
0.4 INJECTION, SOLUTION INTRAMUSCULAR; INTRAVENOUS; SUBCUTANEOUS
Status: DISCONTINUED | OUTPATIENT
Start: 2024-11-02 | End: 2024-11-02 | Stop reason: HOSPADM

## 2024-11-02 RX ORDER — CARBOPROST TROMETHAMINE 250 UG/ML
250 INJECTION, SOLUTION INTRAMUSCULAR
Status: DISCONTINUED | OUTPATIENT
Start: 2024-11-02 | End: 2024-11-03 | Stop reason: HOSPADM

## 2024-11-02 RX ORDER — BUPIVACAINE HYDROCHLORIDE 2.5 MG/ML
INJECTION, SOLUTION EPIDURAL; INFILTRATION; INTRACAUDAL PRN
Status: DISCONTINUED | OUTPATIENT
Start: 2024-11-02 | End: 2024-11-02

## 2024-11-02 RX ORDER — FENTANYL CITRATE 50 UG/ML
50 INJECTION, SOLUTION INTRAMUSCULAR; INTRAVENOUS EVERY 30 MIN PRN
Status: DISCONTINUED | OUTPATIENT
Start: 2024-11-02 | End: 2024-11-02 | Stop reason: HOSPADM

## 2024-11-02 RX ORDER — KETOROLAC TROMETHAMINE 30 MG/ML
30 INJECTION, SOLUTION INTRAMUSCULAR; INTRAVENOUS
Status: COMPLETED | OUTPATIENT
Start: 2024-11-02 | End: 2024-11-02

## 2024-11-02 RX ORDER — MISOPROSTOL 200 UG/1
800 TABLET ORAL
Status: DISCONTINUED | OUTPATIENT
Start: 2024-11-02 | End: 2024-11-02 | Stop reason: HOSPADM

## 2024-11-02 RX ORDER — DOCUSATE SODIUM 100 MG/1
100 CAPSULE, LIQUID FILLED ORAL DAILY
Status: DISCONTINUED | OUTPATIENT
Start: 2024-11-02 | End: 2024-11-03 | Stop reason: HOSPADM

## 2024-11-02 RX ORDER — BISACODYL 10 MG
10 SUPPOSITORY, RECTAL RECTAL DAILY PRN
Status: DISCONTINUED | OUTPATIENT
Start: 2024-11-02 | End: 2024-11-03 | Stop reason: HOSPADM

## 2024-11-02 RX ORDER — LOPERAMIDE HYDROCHLORIDE 2 MG/1
2 CAPSULE ORAL
Status: DISCONTINUED | OUTPATIENT
Start: 2024-11-02 | End: 2024-11-02 | Stop reason: HOSPADM

## 2024-11-02 RX ORDER — LIDOCAINE 40 MG/G
CREAM TOPICAL
Status: DISCONTINUED | OUTPATIENT
Start: 2024-11-02 | End: 2024-11-02 | Stop reason: HOSPADM

## 2024-11-02 RX ORDER — LOPERAMIDE HYDROCHLORIDE 2 MG/1
2 CAPSULE ORAL
Status: DISCONTINUED | OUTPATIENT
Start: 2024-11-02 | End: 2024-11-03 | Stop reason: HOSPADM

## 2024-11-02 RX ORDER — MISOPROSTOL 200 UG/1
400 TABLET ORAL
Status: DISCONTINUED | OUTPATIENT
Start: 2024-11-02 | End: 2024-11-02 | Stop reason: HOSPADM

## 2024-11-02 RX ORDER — TRANEXAMIC ACID 10 MG/ML
1 INJECTION, SOLUTION INTRAVENOUS EVERY 30 MIN PRN
Status: DISCONTINUED | OUTPATIENT
Start: 2024-11-02 | End: 2024-11-03 | Stop reason: HOSPADM

## 2024-11-02 RX ORDER — METOCLOPRAMIDE HYDROCHLORIDE 5 MG/ML
10 INJECTION INTRAMUSCULAR; INTRAVENOUS EVERY 6 HOURS PRN
Status: DISCONTINUED | OUTPATIENT
Start: 2024-11-02 | End: 2024-11-02 | Stop reason: HOSPADM

## 2024-11-02 RX ORDER — METHYLERGONOVINE MALEATE 0.2 MG/ML
200 INJECTION INTRAVENOUS
Status: DISCONTINUED | OUTPATIENT
Start: 2024-11-02 | End: 2024-11-02 | Stop reason: HOSPADM

## 2024-11-02 RX ORDER — IBUPROFEN 800 MG/1
800 TABLET, FILM COATED ORAL
Status: COMPLETED | OUTPATIENT
Start: 2024-11-02 | End: 2024-11-02

## 2024-11-02 RX ORDER — FENTANYL/ROPIVACAINE/NS/PF 2MCG/ML-.1
PLASTIC BAG, INJECTION (ML) EPIDURAL
Status: DISCONTINUED | OUTPATIENT
Start: 2024-11-02 | End: 2024-11-02 | Stop reason: HOSPADM

## 2024-11-02 RX ORDER — LOPERAMIDE HYDROCHLORIDE 2 MG/1
4 CAPSULE ORAL
Status: DISCONTINUED | OUTPATIENT
Start: 2024-11-02 | End: 2024-11-03 | Stop reason: HOSPADM

## 2024-11-02 RX ORDER — CARBOPROST TROMETHAMINE 250 UG/ML
250 INJECTION, SOLUTION INTRAMUSCULAR
Status: DISCONTINUED | OUTPATIENT
Start: 2024-11-02 | End: 2024-11-02 | Stop reason: HOSPADM

## 2024-11-02 RX ORDER — OXYTOCIN 10 [USP'U]/ML
10 INJECTION, SOLUTION INTRAMUSCULAR; INTRAVENOUS
Status: DISCONTINUED | OUTPATIENT
Start: 2024-11-02 | End: 2024-11-03 | Stop reason: HOSPADM

## 2024-11-02 RX ORDER — FENTANYL CITRATE-0.9 % NACL/PF 10 MCG/ML
100 PLASTIC BAG, INJECTION (ML) INTRAVENOUS EVERY 5 MIN PRN
Status: DISCONTINUED | OUTPATIENT
Start: 2024-11-02 | End: 2024-11-02 | Stop reason: HOSPADM

## 2024-11-02 RX ORDER — OXYTOCIN/0.9 % SODIUM CHLORIDE 30/500 ML
340 PLASTIC BAG, INJECTION (ML) INTRAVENOUS CONTINUOUS PRN
Status: DISCONTINUED | OUTPATIENT
Start: 2024-11-02 | End: 2024-11-03 | Stop reason: HOSPADM

## 2024-11-02 RX ORDER — ACETAMINOPHEN 325 MG/1
650 TABLET ORAL EVERY 4 HOURS PRN
Status: DISCONTINUED | OUTPATIENT
Start: 2024-11-02 | End: 2024-11-02 | Stop reason: HOSPADM

## 2024-11-02 RX ORDER — OXYTOCIN/0.9 % SODIUM CHLORIDE 30/500 ML
100-340 PLASTIC BAG, INJECTION (ML) INTRAVENOUS CONTINUOUS PRN
Status: DISCONTINUED | OUTPATIENT
Start: 2024-11-02 | End: 2024-11-03 | Stop reason: HOSPADM

## 2024-11-02 RX ORDER — OXYTOCIN/0.9 % SODIUM CHLORIDE 30/500 ML
340 PLASTIC BAG, INJECTION (ML) INTRAVENOUS CONTINUOUS PRN
Status: DISCONTINUED | OUTPATIENT
Start: 2024-11-02 | End: 2024-11-02 | Stop reason: HOSPADM

## 2024-11-02 RX ORDER — MODIFIED LANOLIN
OINTMENT (GRAM) TOPICAL
Status: DISCONTINUED | OUTPATIENT
Start: 2024-11-02 | End: 2024-11-03 | Stop reason: HOSPADM

## 2024-11-02 RX ORDER — NALBUPHINE HYDROCHLORIDE 20 MG/ML
2.5-5 INJECTION, SOLUTION INTRAMUSCULAR; INTRAVENOUS; SUBCUTANEOUS EVERY 6 HOURS PRN
Status: DISCONTINUED | OUTPATIENT
Start: 2024-11-02 | End: 2024-11-03 | Stop reason: HOSPADM

## 2024-11-02 RX ORDER — OXYTOCIN/0.9 % SODIUM CHLORIDE 30/500 ML
1-24 PLASTIC BAG, INJECTION (ML) INTRAVENOUS CONTINUOUS
Status: DISCONTINUED | OUTPATIENT
Start: 2024-11-02 | End: 2024-11-02 | Stop reason: HOSPADM

## 2024-11-02 RX ORDER — HYDROCORTISONE 25 MG/G
CREAM TOPICAL 3 TIMES DAILY PRN
Status: DISCONTINUED | OUTPATIENT
Start: 2024-11-02 | End: 2024-11-03 | Stop reason: HOSPADM

## 2024-11-02 RX ORDER — LOPERAMIDE HYDROCHLORIDE 2 MG/1
4 CAPSULE ORAL
Status: DISCONTINUED | OUTPATIENT
Start: 2024-11-02 | End: 2024-11-02 | Stop reason: HOSPADM

## 2024-11-02 RX ORDER — ONDANSETRON 2 MG/ML
4 INJECTION INTRAMUSCULAR; INTRAVENOUS EVERY 6 HOURS PRN
Status: DISCONTINUED | OUTPATIENT
Start: 2024-11-02 | End: 2024-11-02 | Stop reason: HOSPADM

## 2024-11-02 RX ORDER — MISOPROSTOL 200 UG/1
400 TABLET ORAL
Status: DISCONTINUED | OUTPATIENT
Start: 2024-11-02 | End: 2024-11-03 | Stop reason: HOSPADM

## 2024-11-02 RX ORDER — ACETAMINOPHEN 325 MG/1
650 TABLET ORAL EVERY 4 HOURS PRN
Status: DISCONTINUED | OUTPATIENT
Start: 2024-11-02 | End: 2024-11-03 | Stop reason: HOSPADM

## 2024-11-02 RX ORDER — SODIUM CHLORIDE, SODIUM LACTATE, POTASSIUM CHLORIDE, CALCIUM CHLORIDE 600; 310; 30; 20 MG/100ML; MG/100ML; MG/100ML; MG/100ML
INJECTION, SOLUTION INTRAVENOUS CONTINUOUS PRN
Status: DISCONTINUED | OUTPATIENT
Start: 2024-11-02 | End: 2024-11-02

## 2024-11-02 RX ORDER — PROCHLORPERAZINE MALEATE 10 MG
10 TABLET ORAL EVERY 6 HOURS PRN
Status: DISCONTINUED | OUTPATIENT
Start: 2024-11-02 | End: 2024-11-02 | Stop reason: HOSPADM

## 2024-11-02 RX ORDER — IBUPROFEN 800 MG/1
800 TABLET, FILM COATED ORAL EVERY 6 HOURS PRN
Status: DISCONTINUED | OUTPATIENT
Start: 2024-11-02 | End: 2024-11-03 | Stop reason: HOSPADM

## 2024-11-02 RX ADMIN — KETOROLAC TROMETHAMINE 30 MG: 30 INJECTION, SOLUTION INTRAMUSCULAR at 14:07

## 2024-11-02 RX ADMIN — DOCUSATE SODIUM 100 MG: 100 CAPSULE, LIQUID FILLED ORAL at 14:47

## 2024-11-02 RX ADMIN — BUPIVACAINE HYDROCHLORIDE 5 ML: 2.5 INJECTION, SOLUTION EPIDURAL; INFILTRATION; INTRACAUDAL at 11:37

## 2024-11-02 RX ADMIN — IBUPROFEN 800 MG: 800 TABLET, FILM COATED ORAL at 20:06

## 2024-11-02 RX ADMIN — BENZOCAINE AND LEVOMENTHOL: 200; 5 SPRAY TOPICAL at 17:21

## 2024-11-02 RX ADMIN — Medication: at 11:33

## 2024-11-02 RX ADMIN — ACETAMINOPHEN 650 MG: 325 TABLET ORAL at 20:06

## 2024-11-02 RX ADMIN — WITCH HAZEL: 500 SOLUTION RECTAL; TOPICAL at 17:21

## 2024-11-02 RX ADMIN — Medication 1 MILLI-UNITS/MIN: at 08:59

## 2024-11-02 RX ADMIN — LIDOCAINE HYDROCHLORIDE 15 ML: 10 INJECTION, SOLUTION EPIDURAL; INFILTRATION; INTRACAUDAL; PERINEURAL at 13:00

## 2024-11-02 RX ADMIN — SODIUM CHLORIDE, POTASSIUM CHLORIDE, SODIUM LACTATE AND CALCIUM CHLORIDE: 600; 310; 30; 20 INJECTION, SOLUTION INTRAVENOUS at 08:52

## 2024-11-02 ASSESSMENT — ACTIVITIES OF DAILY LIVING (ADL)
ADLS_ACUITY_SCORE: 0

## 2024-11-02 NOTE — PROGRESS NOTES
1530 Report received from Melany SANDRA RN.  Cares assumed at that time.  Joseph bedside fundal check performed.

## 2024-11-02 NOTE — CARE PLAN
Data: Patient presented to [facility]: 2024  6:50 AM.  Reason for maternal/fetal assessment is leaking vaginal fluid. Patient reports she had a big gush at 0530, clear fluids. Patient denies uterine contractions, vaginal bleeding, abdominal pain, pelvic pressure, nausea, vomiting, headache, visual disturbances, epigastric or RUQ pain, significant edema. Patient reports fetal movement is normal. Patient is a 38w3d . Prenatal record reviewed. Pregnancy has been uncomplicated. Support person is present.     Fetal HR baseline was  , variability is  . Accelerations:  . Decelerations:  . Uterine assessment is   during contractions and   at rest. Cervical exam deferred. . Membranes: Fern test pending and ROM Plus test pending.     Patient reports no pain and is coping.     Action: Verbal consent for EFM. Triage assessment completed. Patient may meet criteria for early labor discharge.     Response: Patient verbalized understanding of triage assessment. Will contact Dr. Francisco with assessment and consideration of early labor discharge vs admission.     Report given to Marisel SANDRA RN

## 2024-11-02 NOTE — ANESTHESIA PREPROCEDURE EVALUATION
"Anesthesia Pre-Procedure Evaluation    Patient: Mariza Sethi   MRN: [id number] : 1990        Procedure :           History reviewed. No pertinent past medical history.   History reviewed. No pertinent surgical history.   No Known Allergies   Social History     Tobacco Use    Smoking status: Every Day     Current packs/day: 0.25     Types: Cigarettes    Smokeless tobacco: Never    Tobacco comments:     was on NRT   Substance Use Topics    Alcohol use: Not Currently      Wt Readings from Last 1 Encounters:   24 60.3 kg (133 lb)        Anesthesia Evaluation   Pt has not had prior anesthetic         ROS/MED HX  ENT/Pulmonary:  - neg pulmonary ROS     Neurologic:  - neg neurologic ROS     Cardiovascular:  - neg cardiovascular ROS     METS/Exercise Tolerance:     Hematologic:  - neg hematologic  ROS     Musculoskeletal:       GI/Hepatic:  - neg GI/hepatic ROS     Renal/Genitourinary:       Endo:  - neg endo ROS     Psychiatric/Substance Use:  - neg psychiatric ROS     Infectious Disease:       Malignancy:       Other:            Physical Exam    Airway        Mallampati: II   TM distance: > 3 FB   Neck ROM: full   Mouth opening: > 3 cm    Respiratory Devices and Support         Dental  no notable dental history         Cardiovascular   cardiovascular exam normal          Pulmonary   pulmonary exam normal                OUTSIDE LABS:  CBC:   Lab Results   Component Value Date    WBC 18.9 (H) 2022    HGB 13.5 2024    HGB 12.1 07/15/2022    HCT 42.6 2022     2022     BMP: No results found for: \"NA\", \"POTASSIUM\", \"CHLORIDE\", \"CO2\", \"BUN\", \"CR\", \"GLC\"  COAGS:   Lab Results   Component Value Date    PTT 29 2022    INR 0.92 2022     POC: No results found for: \"BGM\", \"HCG\", \"HCGS\"  HEPATIC: No results found for: \"ALBUMIN\", \"PROTTOTAL\", \"ALT\", \"AST\", \"GGT\", \"ALKPHOS\", \"BILITOTAL\", \"BILIDIRECT\", \"NORA\"  OTHER: No results found for: \"PH\", \"LACT\", \"A1C\", \"SELWYN\", \"PHOS\", \"MAG\", " "\"LIPASE\", \"AMYLASE\", \"TSH\", \"T4\", \"T3\", \"CRP\", \"SED\"    Anesthesia Plan    ASA Status:  2       Anesthesia Type: Epidural.              Consents    Anesthesia Plan(s) and associated risks, benefits, and realistic alternatives discussed. Questions answered and patient/representative(s) expressed understanding.     - Discussed:     - Discussed with:  Patient            Postoperative Care            Comments:    Other Comments: RBA discussed including infection, bleeding, nerve damage, headache, unilateral block, and possible need to replace. Patient expressed understanding and desired to proceed.           neg OB ROS.      Amina Thao MD    I have reviewed the pertinent notes and labs in the chart from the past 30 days and (re)examined the patient.  Any updates or changes from those notes are reflected in this note.                                 "

## 2024-11-02 NOTE — ANESTHESIA PROCEDURE NOTES
"Epidural catheter Procedure Note    Pre-Procedure   Staff -        Anesthesiologist:  Amina Thao MD       Performed By: anesthesiologist       Location: OB       Procedure Start/Stop Times: 11/2/2024 11:21 AM and 11/2/2024 11:40 AM       Pre-Anesthestic Checklist: patient identified, IV checked, risks and benefits discussed, informed consent, monitors and equipment checked, pre-op evaluation, at physician/surgeon's request and post-op pain management  Timeout:       Correct Patient: Yes        Correct Procedure: Yes        Correct Site: Yes        Correct Position: Yes   Procedure Documentation  Procedure: epidural catheter       Patient Position: sitting       Patient Prep/Sterile Barriers: x2       Skin prep: Chloraprep       Local skin infiltrated with mL of 1% lidocaine.        Insertion Site: L3-4. (midline approach).       Technique: LORT saline and LORT air        DARA at 5 cm.       Needle Type: [employer] needle       Needle Gauge: 18.        Needle Length (Inches): 3.5        Catheter: 20 G.          Catheter threaded easily.           Threaded 10 cm at skin.         # of attempts: 1 and  # of redirects:  0    Assessment/Narrative         Paresthesias: No.       Test dose of 3 mL lidocaine 1.5% w/ 1:200,000 epinephrine at 11:27 CDT.         Test dose negative, 3 minutes after injection, for signs of intravascular, subdural, or intrathecal injection.       Insertion/Infusion Method: LORT saline and LORT air       Aspiration negative for Heme or CSF via Epidural Catheter.    Medication(s) Administered   Medication Administration Time: 11/2/2024 11:21 AM      FOR [facility] ([facility] ONLY:   Pain Team Contact information: please page the Pain Team Via [employer]. Search \"Pain\". During daytime hours, please page the attending first. At night please page the resident first.      "

## 2024-11-02 NOTE — H&P
OBSTETRICS ADMISSION HISTORY & PHYSICAL  DATE OF ADMISSION: 2024  6:50 AM        Assessment and Plan:   Assessment:  Mariza Sethi is a 34 year old  at 38w3d in prodromal labor. Membranes are ruptured. GBS status is negative. SROM at 5:30 AM confirmed with ROM+. Irregular contractions. RESTREPO of 6. Plan to augment with pitocin due to PROM.   Patient Active Problem List   Diagnosis    Encounter for triage in pregnant patient    Pregnancy    Rupture of amniotic sac less than 24 hours prior to the onset of labor        PLAN:   Admit - see IP orders  Labor induction with Pitocin 2/2 PROM  Pain medication Tylenol, fentanyl, nitrous  Anticipate   GBS: negative. Antibiotics are not indicated   Comfort plan: epidural  Will monitor labor progress along with RN and update attending physician      Alejandro Francisco MD,  2024  Family Medicine  [facility]   [phone]          Chief Complaint:     SROM       History of Present Illness:     Mariza Sethi is a 34 year old year old  at 38w3d confirmed by US. Patient received prenatal care with Clinic, [facility].    Presents to the [facility] for active labor management and SROM.    She reports irregular contractions starting at 6 AM, and occurring every 6-10 minutes. She reports fluid leakage. She denies bleeding per vagina. Fetal movement is normal.    Her prenatal course has been uncomplicated.    Prenatal labs   Lab Results   Component Value Date    AS Negative 2024    HGB 13.5 2024    GBS Negative 2022              Obstetrical History:     OB History    Para Term  AB Living   2 1 1 0 0 1   SAB IAB Ectopic Multiple Live Births   0 0 0 0 1      # Outcome Date GA Lbr Demar/2nd Weight Sex Type Anes PTL Lv   2 Current            1 Term 22 40w1d 22:56 / 05:54 3.66 kg (8 lb 1.1 oz) M Vag-Vacuum EPI N AVERY      Name: GIANMALE-MARIZA      Apgar1: 9  Apgar5: 9              Immunzations:     Most Recent  Immunizations   Administered Date(s) Administered    COVID-19 MONOVALENT 12+ (Pfizer) 07/10/2021    TDAP Vaccine (Adacel) 05/16/2022     Tdap this pregnancy?  UNKNOWN  Flu shot this pregnancy?UNKNOWN           Past Medical History:   History reviewed. No pertinent past medical history.         Past Surgical History:   History reviewed. No pertinent surgical history.         Family History:     Family History   Problem Relation Age of Onset    Chronic Obstructive Pulmonary Disease Mother             Social History:   Tobacco use during pregnancy         Medications:     Current Facility-Administered Medications   Medication Dose Route Frequency Provider Last Rate Last Admin    acetaminophen (TYLENOL) tablet 650 mg  650 mg Oral Q4H PRN Alejandro Francisco MD        carboprost (HEMABATE) injection 250 mcg  250 mcg Intramuscular Q15 Min PRN Alejandro Francisco MD        And    loperamide (IMODIUM) capsule 4 mg  4 mg Oral Once PRN Alejandro Francisco MD        fentaNYL (PF) (SUBLIMAZE) injection 50 mcg  50 mcg Intravenous Q30 Min PRN Alejandro Francisco MD        ketorolac (TORADOL) injection 30 mg  30 mg Intravenous Once PRN Alejandro Francisco MD        Or    ketorolac (TORADOL) injection 30 mg  30 mg Intramuscular Once PRN Alejandro Francisco MD        Or    ibuprofen (ADVIL/MOTRIN) tablet 800 mg  800 mg Oral Once PRN Alejandro Francisco MD        lactated ringers BOLUS 1,000 mL  1,000 mL Intravenous Once PRN Alejandro Francisco MD        Or    lactated ringers BOLUS 500 mL  500 mL Intravenous Once PRN Alejandro Francisco MD        lactated ringers infusion   Intravenous Continuous PRN Alejandro Francisco MD 25 mL/hr at 11/02/24 0852 New Bag at 11/02/24 0852    lidocaine (LMX4) cream   Topical Q1H PRN Alejandro Francisco MD        lidocaine 1 % 0.1-1 mL  0.1-1 mL Other Q1H PRN Alejandro Francisco MD        lidocaine 1 % 0.1-20 mL  0.1-20 mL Subcutaneous Once PRN Alejandro Francisco MD        loperamide (IMODIUM) capsule 2 mg  2 mg Oral Q2H PRN Alejandro Francisco MD         methylergonovine (METHERGINE) injection 200 mcg  200 mcg Intramuscular Q2H PRN Alejandro Francisco MD        metoclopramide (REGLAN) tablet 10 mg  10 mg Oral Q6H PRN Alejandro Francisco MD        Or    metoclopramide (REGLAN) injection 10 mg  10 mg Intravenous Q6H PRN Alejandro Francisco MD        misoprostol (CYTOTEC) tablet 400 mcg  400 mcg Oral ONCE PRN REPEAT PER INSTRUCTIONS Alejandro Francisco MD        Or    misoprostol (CYTOTEC) tablet 800 mcg  800 mcg Rectal ONCE PRN REPEAT PER INSTRUCTIONS Alejandro Francisco MD        naloxone (NARCAN) injection 0.2 mg  0.2 mg Intravenous Q2 Min PRN Alejandro Francisco MD        Or    naloxone (NARCAN) injection 0.4 mg  0.4 mg Intravenous Q2 Min PRN Alejandro Francisco MD        Or    naloxone (NARCAN) injection 0.2 mg  0.2 mg Intramuscular Q2 Min PRN Alejandro Francisco MD        Or    naloxone (NARCAN) injection 0.4 mg  0.4 mg Intramuscular Q2 Min PRN Alejandro Francisco MD        nitrous oxide/oxygen 50/50 blend   Inhalation Continuous PRN Alejandro Francisco MD        ondansetron (ZOFRAN ODT) ODT tab 4 mg  4 mg Oral Q6H PRN Alejandro Francisco MD        Or    ondansetron (ZOFRAN) injection 4 mg  4 mg Intravenous Q6H PRN Alejandro Francisco MD        oxytocin (PITOCIN) 30 units in 500 mL 0.9% NaCl infusion  100-340 mL/hr Intravenous Continuous PRN Alejandro Francisco MD        oxytocin (PITOCIN) 30 units in 500 mL 0.9% NaCl infusion  340 mL/hr Intravenous Continuous PRN Alejandro Francisco MD        oxytocin (PITOCIN) 30 units in 500 mL 0.9% NaCl infusion  1-24 troy-units/min Intravenous Continuous Alejandro Francisco MD 2 mL/hr at 11/02/24 0930 2 troy-units/min at 11/02/24 0930    oxytocin (PITOCIN) injection 10 Units  10 Units Intramuscular Once PRN Alejandro Francisco MD        oxytocin (PITOCIN) injection 10 Units  10 Units Intramuscular Once PRN Alejandro Francisco MD        prochlorperazine (COMPAZINE) tablet 10 mg  10 mg Oral Q6H PRN Alejandro Francisco MD        Or    prochlorperazine (COMPAZINE) injection 10 mg  10 mg  Intravenous Q6H PRN Alejandro Francisco MD        sodium chloride (PF) 0.9% PF flush 3 mL  3 mL Intracatheter Q8H Alejandro Francisco MD        sodium chloride (PF) 0.9% PF flush 3 mL  3 mL Intracatheter q1 min prn Alejandro Francisco MD        sodium citrate-citric acid (BICITRA) solution 30 mL  30 mL Oral Once PRN Alejandro Francisco MD        tranexamic acid 1 g in 100 mL NS IV bag (premix)  1 g Intravenous Q30 Min PRN Alejandro Francisco MD                Allergies:   Patient has no known allergies.         Review of Systems:   CONSTITUTIONAL: no fatigue, no unexpected change in weight  EYES: no acute vision problems or changes  RESP: no significant cough, no shortness of breath  CV: no chest pain, no palpitations, no new or worsening peripheral edema  GI: no nausea, no vomiting, no constipation, no diarrhea  NEURO: no weakness, no dizziness, no headaches         Physical Exam:   Vitals:   /59   Temp 98.3  F (36.8  C) (Oral)   Resp 16   Ht 1.524 m (5')   Wt 60.3 kg (133 lb)   BMI 25.97 kg/m    133 lbs 0 oz  Estimated body mass index is 25.97 kg/m  as calculated from the following:    Height as of this encounter: 1.524 m (5').    Weight as of this encounter: 60.3 kg (133 lb).    GEN: Awake, alert in no apparent distress   HEENT: grossly normal  NECK: no lymphadenopathy or thryoidomegaly  RESPIRATORY: clear to auscultation bilaterally, no increased work of breathing  BACK:  no costovertebral angle tenderness   CARDIOVASCULAR: RRR, no murmur  ABDOMEN: gravid  PELVIC:  no fluid noted, no blood noted.  Presenting part: head.  Cervix: 3-4/50%/-2 (per nursing exam)  EXT:  no edema or calf tenderness    Electronic Fetal Monitoring:  Baseline rate normal  Variability moderate  Accelerations present  Decelerations not present    Assessment: Category I EFM interpretation suggests absence of concern for fetal metabolic acidemia at this time due to accelerations present, decelerations absent, and variability: moderate    Uterine  Activity normal.    Strip reviewed at bedside    NST interpretation:  Baseline rate 140 normal  Accelerations present  Decelerations not present  Interpretation: reactive

## 2024-11-02 NOTE — L&D DELIVERY NOTE
OB Vaginal Delivery Note    Mariza Sethi MRN# [id number]   Age: 34 year old YOB: 1990       GA: 38w3d  GP:   Labor Complications:    EBL:   mL  Delivery QBL: 150 mL  Delivery Type: Vaginal, Spontaneous  ROM to Delivery Time: (Delivered) Hours: 7 Minutes: 20  Pyrites Weight:     1 Minute 5 Minute 10 Minute   Apgar Totals: 9   9        DEMETRICE JASSO R;KELLY SOLORZANO    Delivery Details:  Mariza Sethi, a 34 year old  female delivered a viable infant with apgars of 9  and 9 . Patient was fully dilated and pushing after   hours   minutes in active labor. Delivery was via vaginal, spontaneous to a sterile field under epidural anesthesia. Infant delivered in vertex left occiput anterior position. Anterior and posterior shoulders delivered without difficulty. The cord was clamped, cut twice and 3 vessels were noted. Cord blood was obtained in routine fashion with the following disposition: lab.     Patient has history of smoking during pregnancy. Higher risk for VTE especially with epidural. Plan to start pneumatic compression devices.     Cord complications: none  Placenta delivered at 2024 12:55 PM. Placental disposition was Hospital disposal. Fundal massage performed and fundus found to be firm.     Episiotomy: none   Perineum, vagina, cervix were inspected, and the following lacerations were noted:   Perineal lacerations: 2nd  periurethral laceration: left            Any lacerations were repaired in the usual fashion using 3-0.    Excellent hemostasis was noted. Needle count correct. Infant and patient in delivery room in good and stable condition.        Jossie, Female-Mariza [[id number]]      Labor Event Times      Latent labor onset date/time: 2024 0630    Active labor onset date: 24 Onset time: 11:00 AM   Dilation complete date: 24 Complete time: 12:05 PM   Start pushing date/time: 2024 1230          Labor Events     labor?: No   steroids:  Was profound on admission, but overall improved and then continued to wax/wane. Worsened over past 48 hr. I did not see patient Monday, but RN today had him Monday and says he was more interactive and awake that day.  Suspect related to hospital delirium and also UTI (diagnosed 10/29)  Currently strict NPO.  CT brain normal. Labs otherwise unremarkable.   None  Labor Type: Augmentation  Predominate monitoring during 1st stage: continuous electronic fetal monitoring       Rupture date/time: 24 0530   Rupture type: Spontaneous Rupture of Membranes  Fluid color: Clear  Fluid odor: Normal     Induction: Oxytocin  Induction date/time:      Cervical ripening date/time:      Indications for induction: Prelabor ROM     Augmentation: None  Augmentation date/time: 24 0900          Delivery/Placenta Date and Time      Delivery Date: 24 Delivery Time: 12:50 PM   Placenta Date/Time: 2024 12:55 PM  Oxytocin given at the time of delivery: after delivery of baby  Delivering clinician: Alejandro Francisco MD   Other personnel present at delivery:  Provider Role   Marisel Euceda RN    Yaneth Allan RN              Vaginal Counts       Initial count performed by 2 team members:  Two Team Members   dr toro euceda, rn         Needles Suture Needles Sponges (RETIRED) Instruments   Initial counts 0 0 5    Added to count 1 2     Relief counts       Final counts               Placed during labor Accounted for at the end of labor   FSE No NA   IUPC No NA   Cervidil No NA                             Apgars    Living status: Living   1 Minute 5 Minute 10 Minute 15 Minute 20 Minute   Skin color: 1  1       Heart rate: 2  2       Reflex irritability: 2  2       Muscle tone: 2  2       Respiratory effort: 2  2       Total: 9  9              Cord      Vessels: 3 Vessels    Cord Complications: None               Cord Blood Disposition: Lab    Gases Sent?: No    Delayed cord clamping?: Yes    Cord Clamping Delay (seconds): >120 seconds           [place] Resuscitation    Methods: None  Output in Delivery Room: Voided        Measurements    Output in delivery room: Voided       Skin to Skin and Feeding Plan      Skin to skin initiation date/time: 1841    Skin to skin with: Mother  Skin to skin end date/time:            Labor Events and Shoulder Dystocia    Fetal  Tracing Prior to Delivery: Category 1  Shoulder dystocia present?: Neg       Delivery (Maternal) (Provider to Complete) ([id number])    Episiotomy: None  Perineal lacerations: 2nd      Periurethral laceration: left    Repair suture: 3-0 Vicryl  Genital tract inspection done: Pos       Blood Loss  Mother: Mariza Sethi #[id number]     Start of Mother's Information      Delivery Blood Loss   Intrapartum & Postpartum: 11/02/24 1100 - 11/02/24 1337    Delivery Admission: 11/02/24 0650 - 11/02/24 1337         Intrapartum & Postpartum Delivery Admission    Delivery QBL (mL) Hospital Encounter 150 mL 150 mL    Total  150 mL 150 mL               End of Mother's Information  Mother: Mariza Sethi #[id number]                Delivery - Provider to Complete ([id number])    Delivering clinician: Alejandro Francisco MD  Delivery Type (Choose the 1 that will go to the Birth History): Vaginal, Spontaneous                         Other personnel:  Provider Role   Marisel Euceda RN Perry, Molly K RN                     Placenta    Date/Time: 11/2/2024 12:55 PM  Removal: Spontaneous  Disposition: Hospital disposal             Anesthesia    Method: Epidural  Cervical dilation at placement: 4-7                    Presentation and Position    Presentation: Vertex    Position: Left Occiput Anterior                     Alejandro Francisco MD

## 2024-11-03 VITALS
WEIGHT: 133 LBS | TEMPERATURE: 97.7 F | SYSTOLIC BLOOD PRESSURE: 99 MMHG | OXYGEN SATURATION: 97 % | HEART RATE: 71 BPM | HEIGHT: 60 IN | BODY MASS INDEX: 26.11 KG/M2 | DIASTOLIC BLOOD PRESSURE: 52 MMHG | RESPIRATION RATE: 16 BRPM

## 2024-11-03 LAB — T PALLIDUM AB SER QL: NONREACTIVE

## 2024-11-03 PROCEDURE — 250N000013 HC RX MED GY IP 250 OP 250 PS 637

## 2024-11-03 RX ADMIN — IBUPROFEN 800 MG: 800 TABLET, FILM COATED ORAL at 11:09

## 2024-11-03 RX ADMIN — ACETAMINOPHEN 650 MG: 325 TABLET ORAL at 14:04

## 2024-11-03 RX ADMIN — IBUPROFEN 800 MG: 800 TABLET, FILM COATED ORAL at 04:51

## 2024-11-03 RX ADMIN — DOCUSATE SODIUM 100 MG: 100 CAPSULE, LIQUID FILLED ORAL at 09:40

## 2024-11-03 RX ADMIN — ACETAMINOPHEN 650 MG: 325 TABLET ORAL at 07:49

## 2024-11-03 RX ADMIN — ACETAMINOPHEN 650 MG: 325 TABLET ORAL at 00:51

## 2024-11-03 ASSESSMENT — ACTIVITIES OF DAILY LIVING (ADL)
ADLS_ACUITY_SCORE: 0

## 2024-11-03 NOTE — DISCHARGE INSTRUCTIONS
Warning Signs after Having a Baby    Keep this paper on your fridge or somewhere else where you can see it.    Call your provider if you have any of these symptoms up to 12 weeks after having your baby.    Thoughts of hurting yourself or your baby  Pain in your chest or trouble breathing  Severe headache not helped by pain medicine  Eyesight concerns (blurry vision, seeing spots or flashes of light, other changes to eyesight)  Fainting, shaking or other signs of a seizure    Call 9-1-1 if you feel that it is an emergency.     The symptoms below can happen to anyone after giving birth. They can be very serious. Call your provider if you have any of these warning signs.    My provider s phone number: _______________________    Losing too much blood (hemorrhage)    Call your provider if you soak through a pad in less than an hour or pass blood clots bigger than a golf ball. These may be signs that you are bleeding too much.    Blood clots in the legs or lungs    After you give birth, your body naturally clots its blood to help prevent blood loss. Sometimes this increased clotting can happen in other areas of the body, like the legs or lungs. This can block your blood flow and be very dangerous.     Call your provider if you:  Have a red, swollen spot on the back of your leg that is warm or painful when you touch it.   Are coughing up blood.     Infection    Call your provider if you have any of these symptoms:  Fever of 100.4 F (38 C) or higher.  Pain or redness around your stitches if you had an incision.   Any yellow, white, or green fluid coming from places where you had stitches or surgery.    Mood Problems (postpartum depression)    Many people feel sad or have mood changes after having a baby. But for some people, these mood swings are worse.     Call your provider right away if you feel so anxious or nervous that you can't care for yourself or your baby.    Preeclampsia (high blood pressure)    Even if you  didn't have high blood pressure when you were pregnant, you are at risk for the high blood pressure disease called preeclampsia. This risk can last up to 12 weeks after giving birth.     Call your provider if you have:   Pain on your right side under your rib cage  Sudden swelling in the hands and face    Remember: You know your body. If something doesn't feel right, get medical help.     For informational purposes only. Not to replace the advice of your health care provider. Copyright 2020 [facility]. All rights reserved. Clinically reviewed by Becki Bustamante, RNC-OB, MSN. [employer] [id number] - Rev 02/23.

## 2024-11-03 NOTE — DISCHARGE SUMMARY
Post-partum Discharge Summary    Mariza Sethi MRN# [id number]   Age: 34 year old YOB: 1990     Date of Admission:  2024  Date of Discharge::  11/3/2024  Admitting Physician:  Alejandro Francisco MD  Discharge Physician:  Alejandro Francisco MD       Home clinic: [facility]            Admission Diagnoses:   Encounter for triage in pregnant patient  Rupture of amniotic sac less than 24 hours prior to the onset of labor.           Discharge Diagnosis:     Normal spontaneous vaginal delivery  Intrauterine pregnancy at 38 weeks gestation  Patient Active Problem List   Diagnosis    Encounter for triage in pregnant patient    Pregnancy    Rupture of amniotic sac less than 24 hours prior to the onset of labor             Procedures:     Procedure(s): Repair of second degree perineal laceration       No other procedures performed during this admission           Medications Prior to Admission:     Medications Prior to Admission   Medication Sig Dispense Refill Last Dose/Taking    Prenatal Vit-Fe Fumarate-FA (PRENATAL MULTIVITAMIN W/IRON) 27-0.8 MG tablet Take 1 tablet by mouth daily   2024    [DISCONTINUED] progesterone (PROMETRIUM) 200 MG capsule Take 400 mg by mouth daily.   2024 Bedtime             Discharge Medications:     Current Discharge Medication List        CONTINUE these medications which have NOT CHANGED    Details   Prenatal Vit-Fe Fumarate-FA (PRENATAL MULTIVITAMIN W/IRON) 27-0.8 MG tablet Take 1 tablet by mouth daily           STOP taking these medications       progesterone (PROMETRIUM) 200 MG capsule Comments:   Reason for Stopping:                     Consultations:   No consultations were requested during this admission          Brief History of Labor:   On 2024 at 12:50 PM, this 34 year old year old  under Epidural analgesia delivered a viable female infant weighing 6 lbs 8.41 oz with APGAR scores below:  APGARs 1 Min 5Min 10Min   Totals: 9   9                  Hospital Course (HPI):   The patient's hospital course was unremarkable.  On discharge, her pain was well controlled. Vaginal bleeding is decreased.  Voiding without difficulty.  Ambulating well and tolerating a normal diet.  No fever.     Patient received <4 hours of pitocin to augment labor due to ineffective contractions following SROM at home.     Infant is stable. Feeding Method: BreastfeedingBreast feeding going well    She was discharged on post-partum day #1.          D/C Physical Exam:     Vitals:    11/03/24 0051 11/03/24 0451 11/03/24 0728 11/03/24 1148   BP: 94/52 98/57 95/51 99/52   BP Location: Left arm Left arm Left arm Left arm   Patient Position: Semi-Owens's Semi-Owens's     Cuff Size: Adult Regular Adult Regular     Pulse: 72 72 78 71   Resp: 18 18 16 16   Temp: 98.8  F (37.1  C) 98.1  F (36.7  C) 98  F (36.7  C) 97.7  F (36.5  C)   TempSrc: Oral Oral Oral Oral   SpO2: 97% 98% 97% 97%   Weight:       Height:           GENERAL:         healthy, alert, and no distress  RESPIRATORY:   No increased work of breathing, good air exchange, clear to auscultation bilaterally, no crackles or wheezing   CARDIOVASCULAR:   Normal regular rate and rhythm, normal S1 and S2, no S3 or S4, and no murmur noted   ABDOMEN:   No scars, normal bowel sounds, soft, non-distended, non-tender, no masses palpated, no hepatosplenomegally  Fundal height at level of umbilicus + 0 cm.  Firm and non-tender.   EXTREMITIES:          No edema, non-tender     Post-partum hemoglobin:   Hemoglobin   Date Value Ref Range Status   11/02/2024 13.5 11.7 - 15.7 g/dL Final           Discharge Instructions and Follow-Up:     Discharge diet: Regular   Discharge activity: Activity as tolerated   Discharge follow-up: Follow up with primary care provider in 6 weeks   Wound care: Drink plenty of fluids  Ice to area for comfort  Keep wound clean and dry          Discharge Disposition:     Discharged to home          Alejandro Francisco  MD  11/3/2024  [facility]  [facility]   [phone]     Name:  Mariza Sethi  :  1990  MRN:  [id number]

## 2024-11-03 NOTE — PROGRESS NOTES
1920 Report given to IRIS Sarah.  Cares relinquished at that time.  Bedside safety check performed.

## 2024-11-03 NOTE — PROGRESS NOTES
D:  Patient admitted to [facility]19/PJOX11-3 via wheelchair with  and support person Shon.  A:  Bedside report received from Marissa CHAO Oriented patient and family to surroundings; call light within reach. 4 Part ID bands double checked with reporting RN.  R:  Patient and  tolerated transfer. Care assumed.

## 2024-11-03 NOTE — LACTATION NOTE
Initial Lactation Visit    Current age: 20 hours old    Gestational age at delivery: 38w3d     Diaper count: 5 wet 3 soiled      Feedings: 8 breast  0 supplement      Weight Loss: pending 24 hour cares    Breastfeeding goals:breast + formula, would like to also offer formula at some point d/t selling and moving.     Past breastfeeding experience:  her first child exclusively for 3 months, than returned to work and was not able to maintain milk supply, so than introduced formula. Continued breastfeeding up to once a day until 1yo.     Maternal health risk that may affect breastfeeding:AMA    Home Pump: Lactation distributed mother a Spectra breast pump from [facility].  Mother was instructed on the use and cleaning of the breast pump.  Mother verbalizes understanding of how to use the breast pump.  She was instructed to empty her breasts 8-12 times in 24 hours, either with the baby at the breast or the breast pump, to have optimal milk production for her .       Breast assessment: Breast: Round, Symmetrical, and Soft ,  Nipples: Everted and Intact    Feeding assessment: Mother independently latched infant to both sides, traditional cradle hold, shallow latch but Mother stated comfortable. Mother explained that they recently sold their house and moving to [place], as well as both her and partner quit jobs for the time being. With these changes she is wanting to help pack and move home items therefore wanting to initiate formula, so questions around when to start this. Discussed feeding options and importance of pumping if bottle is given to protect milk supply.      Breastfeeding Plan:     Offer breast every 2-3 hours or sooner if baby is showing hunger cues  Massage breast to encourage milk flow   Strive for a deep and comfortable latch (Should feel like a tugging at the nipple)   Positioning reminders:  Line up baby's nose to nipple   Baby's chin should be tilted into the  breast tissue and baby's nose gentle touching the breast   Ear, shoulder, hip, nice straight line   Chin of baby should not be resting on the baby's chest.   Your thumb lined up like baby's mustache, fingers under breast like a baby's beard  Baby's cheeks should be touching breast equally on both sides   Switch sides when swallows slow, baby pauses lengthen and  breast compressions do not increase the swallows or activity at the breast     Overall goals for baby:    Feed well at breast 8 or more times per day, 15 minutes of active swallowing over 20-40 minutes at breast  Lose no more than 8-10% of birthweight in the first 3 days  Meet goals for wet and soiled diapers (per Postpartum &  Care booklet)  Weight back to birthweight in 10-14 days    If Above Goals Are Not Met     Pump 15-20 minutes after breastfeeding to stimulate and collect breast milk.  Offer back pumped milk after every feeding until infant is meeting goals above     Feed Expressed Milk to Baby Using Amounts Below as a Guideline:    0-24 hours is 2-10 ml per feeding   24-48 hours  is 5-15ml per feeding   48-72 hours is 15-30ml per feeding   72-96 hours is 30-60ml per feeding   96 hours and older follow healthcare providers recommendation    Give more as baby cues. If necessary, make up difference with donor milk or formula as a bridge until milk supply increases.     Education:   [x] Expected  feeding patterns in the first few days (pg. 38 of Your Guide to To Postpartum and  Care)/ the Second Night  [x] Stages of milk production  [] Hand expression   [] Feeding cues     [] Benefits of skin to skin  [x] Breastfeeding positions  [x] Tips to get and maintain a deep latch  [] Usage of nipple shield  [] Nutritive vs.non-nutritive sucking  [] Gentle breast compressions as needed  [] How to tell when baby is finished  [x] Supplementation  [] Paced bottle feeding  [] Spoon/cup feeding  [] LPI feeding patterns  [] LBW feeding patterns  [x]  Expected  output  [] [place] weight loss  [x] Infant Feeding Log  [] Calming techniques  [x] Engorgement/Reverse Pressure Softening  [x] Pumping  [x] Breastpump education  [x] Inpatient breastfeeding support  [] Outpatient lactation resources    Follow up: Plan to discharge today.

## 2024-11-03 NOTE — PLAN OF CARE
Goal Outcome Evaluation:      Plan of Care Reviewed With: patient    Overall Patient Progress: improvingOverall Patient Progress: improving    Vital signs stable. Postpartum assessment WDL. Pain controlled with tylenol and motrin. Patient voiding without difficulty. Breastfeeding on cue without assist. Patient and infant bonding well. Will continue with   current plan of care.    Problem: Adult Inpatient Plan of Care  Goal: Readiness for Transition of Care  Outcome: Progressing

## 2024-11-03 NOTE — PROGRESS NOTES
1850 RN spoke with the Anesthesiologist.  RN relayed to MD that upper thigh on right leg continues to be numb.  Neurologically, patient has normal strength and function of her right leg, no foot drop noted.  Patient did state that her right knee feels like it may be catching a little bit.  RN also relayed that with her last delivery, patient had reported numbness on her right leg that had lasted a week with R foot weakness post epidural.  Per Anesthesiologist: MD does not have any concerns at this time, this can be a normal side effect post epidural.

## 2024-11-03 NOTE — PLAN OF CARE
Goal Outcome Evaluation:      Plan of Care Reviewed With: patient    Overall Patient Progress: improvingOverall Patient Progress: improving         Discharge summary and education gone over with pt and her significant other in the room. All questions and concerns were answered at this time. Pt will be discharging to home with infant in car seat.

## 2024-11-03 NOTE — ANESTHESIA POSTPROCEDURE EVALUATION
Patient: Mariza Sethi    Procedure: * No procedures listed *       Anesthesia Type:  Epidural    Note:  Disposition: Inpatient   Postop Pain Control: Uneventful            Sign Out: Well controlled pain   PONV: No   Neuro/Psych: Uneventful            Sign Out: Acceptable/Baseline neuro status   Airway/Respiratory: Uneventful            Sign Out: Acceptable/Baseline resp. status   CV/Hemodynamics: Uneventful            Sign Out: Acceptable CV status; No obvious hypovolemia; No obvious fluid overload   Other NRE:    DID A NON-ROUTINE EVENT OCCUR?        Last vitals:  Vitals:    11/03/24 0051 11/03/24 0451 11/03/24 0728   BP: 94/52 98/57 95/51   Pulse: 72 72 78   Resp: 18 18 16   Temp: 37.1  C (98.8  F) 36.7  C (98.1  F) 36.7  C (98  F)   SpO2: 97% 98% 97%       Electronically Signed By: Joseph House MD  November 3, 2024  9:27 AM

## 2024-11-03 NOTE — PLAN OF CARE
Problem: Postpartum (Vaginal Delivery)  Goal: Optimal Pain Control and Function  Outcome: Progressing  Intervention: Prevent or Manage Pain  Recent Flowsheet Documentation  Taken 11/3/2024 1109 by Shayy Dumont RN  Pain Management Interventions: medication (see MAR)  Taken 11/3/2024 0749 by Shayy Dumont RN  Pain Management Interventions: medication (see MAR)   Goal Outcome Evaluation:      Plan of Care Reviewed With: patient    Overall Patient Progress: improvingOverall Patient Progress: improving         Pt is taking prn pain medications in order to stay on top of pain management. Pt is rating pain a 3 on the 0-10 pain scale. Pt is up ambulating in the room independently.

## 2024-12-08 ENCOUNTER — HEALTH MAINTENANCE LETTER (OUTPATIENT)
Age: 34
End: 2024-12-08